# Patient Record
Sex: FEMALE | Race: WHITE | NOT HISPANIC OR LATINO
[De-identification: names, ages, dates, MRNs, and addresses within clinical notes are randomized per-mention and may not be internally consistent; named-entity substitution may affect disease eponyms.]

---
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
## 2019-02-07 ENCOUNTER — TRANSCRIPTION ENCOUNTER (OUTPATIENT)
Age: 19
End: 2019-02-07

## 2019-04-02 ENCOUNTER — APPOINTMENT (OUTPATIENT)
Dept: FAMILY MEDICINE | Facility: CLINIC | Age: 19
End: 2019-04-02
Payer: COMMERCIAL

## 2019-04-02 VITALS
HEART RATE: 74 BPM | RESPIRATION RATE: 16 BRPM | BODY MASS INDEX: 20.61 KG/M2 | HEIGHT: 62 IN | OXYGEN SATURATION: 98 % | TEMPERATURE: 97.8 F | WEIGHT: 112 LBS | SYSTOLIC BLOOD PRESSURE: 100 MMHG | DIASTOLIC BLOOD PRESSURE: 64 MMHG

## 2019-04-02 DIAGNOSIS — Z81.8 FAMILY HISTORY OF OTHER MENTAL AND BEHAVIORAL DISORDERS: ICD-10-CM

## 2019-04-02 DIAGNOSIS — R79.9 ABNORMAL FINDING OF BLOOD CHEMISTRY, UNSPECIFIED: ICD-10-CM

## 2019-04-02 LAB — CYTOLOGY CVX/VAG DOC THIN PREP: NORMAL

## 2019-04-02 PROCEDURE — 99385 PREV VISIT NEW AGE 18-39: CPT | Mod: 25

## 2019-04-02 PROCEDURE — G0444 DEPRESSION SCREEN ANNUAL: CPT

## 2019-04-02 PROCEDURE — 36415 COLL VENOUS BLD VENIPUNCTURE: CPT

## 2019-04-02 NOTE — PHYSICAL EXAM
[No Acute Distress] : no acute distress [Well Nourished] : well nourished [Well Developed] : well developed [Well-Appearing] : well-appearing [Normal Sclera/Conjunctiva] : normal sclera/conjunctiva [PERRL] : pupils equal round and reactive to light [EOMI] : extraocular movements intact [Normal Outer Ear/Nose] : the outer ears and nose were normal in appearance [Normal Oropharynx] : the oropharynx was normal [Normal TMs] : both tympanic membranes were normal [Supple] : supple [No Lymphadenopathy] : no lymphadenopathy [Thyroid Normal, No Nodules] : the thyroid was normal and there were no nodules present [No Respiratory Distress] : no respiratory distress  [Clear to Auscultation] : lungs were clear to auscultation bilaterally [No Accessory Muscle Use] : no accessory muscle use [Normal Rate] : normal rate  [Regular Rhythm] : with a regular rhythm [Normal S1, S2] : normal S1 and S2 [No Murmur] : no murmur heard [Pedal Pulses Present] : the pedal pulses are present [No Edema] : there was no peripheral edema [Soft] : abdomen soft [Non Tender] : non-tender [Non-distended] : non-distended [No Masses] : no abdominal mass palpated [No HSM] : no HSM [Normal Bowel Sounds] : normal bowel sounds [Normal Posterior Cervical Nodes] : no posterior cervical lymphadenopathy [Normal Anterior Cervical Nodes] : no anterior cervical lymphadenopathy [No CVA Tenderness] : no CVA  tenderness [No Spinal Tenderness] : no spinal tenderness [No Joint Swelling] : no joint swelling [Grossly Normal Strength/Tone] : grossly normal strength/tone [No Rash] : no rash [Normal Gait] : normal gait [No Focal Deficits] : no focal deficits [Normal Affect] : the affect was normal [Alert and Oriented x3] : oriented to person, place, and time [Normal Insight/Judgement] : insight and judgment were intact

## 2019-04-02 NOTE — ASSESSMENT
[FreeTextEntry1] : Health maintenance\par - comprehensive labs \par - last saw GYN in 7/2017 for pap\par - negative depression screening\par - will get vaccine records from previous PCP\par - had flu shot this year\par \par Stress incontinence\par - recommend pelvic floor exercise\par - urology consult\par \par Difficulty concentrating\par - noticed mostly with school work\par - work on focusing techniques\par - has never been on medication for this before\par - recommend neuropsych evaluation

## 2019-04-02 NOTE — HISTORY OF PRESENT ILLNESS
[FreeTextEntry1] : cpe [de-identified] : Last physical 2 years ago. \par Overall things going well, no major complaints. \par Dealing with significant stress incontinence.  She can't hold her bladder when she laughs or bears down.  It happens every time she laughs continuously.  It happens at least a few times per week.  She is currently wearing pads. \par She also had noticed some difficulty concentrating.  Her brother is diagnosed with ADHD.  Would like to be evaluated by neuro.

## 2019-04-02 NOTE — COUNSELING
[Healthy eating counseling provided] : healthy eating [Activity counseling provided] : activity [Good understanding] : Patient has a good understanding of disease, goals and obesity follow-up plan [ - Annual Depression Screening] : Annual Depression Screening [de-identified] : diet - healthy\par exercise - minimal

## 2019-04-02 NOTE — HEALTH RISK ASSESSMENT
[Good] : ~his/her~  mood as  good [No falls in past year] : Patient reported no falls in the past year [0] : 2) Feeling down, depressed, or hopeless: Not at all (0) [Patient reported PAP Smear was normal] : Patient reported PAP Smear was normal [HIV Test offered] : HIV Test offered [None] : None [With Family] : lives with family [Employed] : employed [Student] : student [Single] : single [Sexually Active] : sexually active [Feels Safe at Home] : Feels safe at home [Fully functional (bathing, dressing, toileting, transferring, walking, feeding)] : Fully functional (bathing, dressing, toileting, transferring, walking, feeding) [Fully functional (using the telephone, shopping, preparing meals, housekeeping, doing laundry, using] : Fully functional and needs no help or supervision to perform IADLs (using the telephone, shopping, preparing meals, housekeeping, doing laundry, using transportation, managing medications and managing finances) [Smoke Detector] : smoke detector [Carbon Monoxide Detector] : carbon monoxide detector [Seat Belt] :  uses seat belt [Sunscreen] : uses sunscreen [Patient/Caregiver not ready to engage] : Patient/Caregiver not ready to engage [] : No [[id number]] : 0 [Change in mental status noted] : No change in mental status noted [Language] : denies difficulty with language [Behavior] : denies difficulty with behavior [Reasoning] : denies difficulty with reasoning [High Risk Behavior] : no high risk behavior [Reports changes in hearing] : Reports no changes in hearing [Reports changes in vision] : Reports no changes in vision [Reports changes in dental health] : Reports no changes in dental health [PapSmearDate] : 7/2017 [FreeTextEntry2] : Lian, nursing, and is a nursing assistant at [employer]

## 2019-04-03 LAB
25(OH)D3 SERPL-MCNC: 25.2 NG/ML
ALBUMIN SERPL ELPH-MCNC: 4.5 G/DL
ALP BLD-CCNC: 71 U/L
ALT SERPL-CCNC: 14 U/L
ANION GAP SERPL CALC-SCNC: 11 MMOL/L
APPEARANCE: CLEAR
AST SERPL-CCNC: 16 U/L
BASOPHILS # BLD AUTO: 0.03 K/UL
BASOPHILS NFR BLD AUTO: 0.5 %
BILIRUB SERPL-MCNC: 0.2 MG/DL
BILIRUBIN URINE: NEGATIVE
BLOOD URINE: NEGATIVE
BUN SERPL-MCNC: 9 MG/DL
CALCIUM SERPL-MCNC: 9.4 MG/DL
CHLORIDE SERPL-SCNC: 105 MMOL/L
CHOLEST SERPL-MCNC: 188 MG/DL
CHOLEST/HDLC SERPL: 3.1 RATIO
CO2 SERPL-SCNC: 26 MMOL/L
COLOR: YELLOW
CREAT SERPL-MCNC: 0.58 MG/DL
EOSINOPHIL # BLD AUTO: 0.05 K/UL
EOSINOPHIL NFR BLD AUTO: 0.8 %
ESTIMATED AVERAGE GLUCOSE: 97 MG/DL
GLUCOSE QUALITATIVE U: NEGATIVE
GLUCOSE SERPL-MCNC: 90 MG/DL
HBA1C MFR BLD HPLC: 5 %
HCT VFR BLD CALC: 40.3 %
HDLC SERPL-MCNC: 60 MG/DL
HGB BLD-MCNC: 13.3 G/DL
HIV1+2 AB SPEC QL IA.RAPID: NONREACTIVE
IMM GRANULOCYTES NFR BLD AUTO: 0.2 %
KETONES URINE: NEGATIVE
LDLC SERPL CALC-MCNC: 108 MG/DL
LEUKOCYTE ESTERASE URINE: NEGATIVE
LYMPHOCYTES # BLD AUTO: 1.77 K/UL
LYMPHOCYTES NFR BLD AUTO: 29.1 %
MAN DIFF?: NORMAL
MCHC RBC-ENTMCNC: 30.6 PG
MCHC RBC-ENTMCNC: 33 GM/DL
MCV RBC AUTO: 92.6 FL
MONOCYTES # BLD AUTO: 0.42 K/UL
MONOCYTES NFR BLD AUTO: 6.9 %
NEUTROPHILS # BLD AUTO: 3.81 K/UL
NEUTROPHILS NFR BLD AUTO: 62.5 %
NITRITE URINE: NEGATIVE
PH URINE: 7.5
PLATELET # BLD AUTO: 267 K/UL
POTASSIUM SERPL-SCNC: 4.5 MMOL/L
PROT SERPL-MCNC: 7.1 G/DL
PROTEIN URINE: NEGATIVE
RBC # BLD: 4.35 M/UL
RBC # FLD: 11.9 %
SODIUM SERPL-SCNC: 142 MMOL/L
SPECIFIC GRAVITY URINE: 1.02
T4 FREE SERPL-MCNC: 1 NG/DL
TRIGL SERPL-MCNC: 102 MG/DL
TSH SERPL-ACNC: 3.14 UIU/ML
UROBILINOGEN URINE: NORMAL
WBC # FLD AUTO: 6.09 K/UL

## 2019-04-04 LAB — BACTERIA UR CULT: NORMAL

## 2019-04-09 ENCOUNTER — APPOINTMENT (OUTPATIENT)
Dept: UROLOGY | Facility: CLINIC | Age: 19
End: 2019-04-09
Payer: COMMERCIAL

## 2019-04-09 VITALS
HEIGHT: 62 IN | HEART RATE: 82 BPM | BODY MASS INDEX: 20.61 KG/M2 | TEMPERATURE: 98.2 F | WEIGHT: 112 LBS | OXYGEN SATURATION: 97 % | DIASTOLIC BLOOD PRESSURE: 62 MMHG | SYSTOLIC BLOOD PRESSURE: 99 MMHG

## 2019-04-09 PROCEDURE — 99204 OFFICE O/P NEW MOD 45 MIN: CPT | Mod: 25

## 2019-04-09 PROCEDURE — 51741 ELECTRO-UROFLOWMETRY FIRST: CPT

## 2019-04-09 PROCEDURE — 51798 US URINE CAPACITY MEASURE: CPT | Mod: 59

## 2019-04-09 NOTE — REVIEW OF SYSTEMS
[Eyesight Problems] : eyesight problems [see HPI] : see HPI [Date of last menstrual period ____] : date of last menstrual period: [unfilled] [Bladder fullness after urinating] : bladder fullness after urinating [Wait a long time to urinate] : waits a long time to urinate [Leakage of urine with straining, coughing, laughing] : leakage of urine with straining, coughing, laughing [Negative] : Endocrine

## 2019-04-09 NOTE — PHYSICAL EXAM

## 2019-04-09 NOTE — HISTORY OF PRESENT ILLNESS
[FreeTextEntry1] : 18 year old woman seen 04/09/2019 with complaint of stress urinary incontinence. This began years ago. \par It is severe in severity. She leaks with any valsalva such and cough or sneeze but most often laughter. She feels the leakage is getting worse. She states she doesn't void much, maybe once a day. She denies any UTIs now or as a younger child. No congenital or significant pediatric dieseaes. \par Nothing makes the symptoms better, nothing makes sx worse. It is associated with nothing.\par No hematuria, no dysuria, no frequency, no urgency, no hesitancy, no straining.\par No fevers, no chills, no nausea, no vomiting, no flank pain. No family history contributory to AKIN. \par \par Uroflow (04/09/2019 ): 3 small bell shaped curves, VV 57 mL, qMax 7 ml/s, qAve 4 ml/s\par PVR (04/09/2019 ): 6 mL

## 2019-04-11 ENCOUNTER — APPOINTMENT (OUTPATIENT)
Dept: UROLOGY | Facility: CLINIC | Age: 19
End: 2019-04-11

## 2019-04-12 ENCOUNTER — APPOINTMENT (OUTPATIENT)
Dept: UROLOGY | Facility: CLINIC | Age: 19
End: 2019-04-12

## 2019-04-18 ENCOUNTER — APPOINTMENT (OUTPATIENT)
Dept: UROLOGY | Facility: CLINIC | Age: 19
End: 2019-04-18

## 2019-07-09 ENCOUNTER — APPOINTMENT (OUTPATIENT)
Dept: FAMILY MEDICINE | Facility: CLINIC | Age: 19
End: 2019-07-09

## 2019-07-11 LAB
M TB IFN-G BLD-IMP: NEGATIVE
MEV IGG FLD QL IA: 21.9 AU/ML
MEV IGG+IGM SER-IMP: NEGATIVE
MUV AB SER-ACNC: POSITIVE
MUV IGG SER QL IA: 79.5 AU/ML
QUANTIFERON TB PLUS MITOGEN MINUS NIL: 9.4 IU/ML
QUANTIFERON TB PLUS NIL: 0.03 IU/ML
QUANTIFERON TB PLUS TB1 MINUS NIL: -0.01 IU/ML
QUANTIFERON TB PLUS TB2 MINUS NIL: -0.01 IU/ML
RUBV IGG FLD-ACNC: 5 INDEX
RUBV IGG SER-IMP: POSITIVE
VZV AB TITR SER: POSITIVE
VZV IGG SER IF-ACNC: 188.6 INDEX

## 2019-07-12 ENCOUNTER — APPOINTMENT (OUTPATIENT)
Dept: FAMILY MEDICINE | Facility: CLINIC | Age: 19
End: 2019-07-12

## 2019-09-19 ENCOUNTER — TRANSCRIPTION ENCOUNTER (OUTPATIENT)
Age: 19
End: 2019-09-19

## 2019-11-14 ENCOUNTER — INBOUND DOCUMENT (OUTPATIENT)
Age: 19
End: 2019-11-14

## 2019-11-14 ENCOUNTER — TRANSCRIPTION ENCOUNTER (OUTPATIENT)
Age: 19
End: 2019-11-14

## 2019-11-15 ENCOUNTER — INBOUND DOCUMENT (OUTPATIENT)
Age: 19
End: 2019-11-15

## 2019-11-15 ENCOUNTER — EMERGENCY (EMERGENCY)
Facility: HOSPITAL | Age: 19
LOS: 0 days | Discharge: HOME | End: 2019-11-15
Attending: EMERGENCY MEDICINE
Payer: COMMERCIAL

## 2019-11-15 VITALS
DIASTOLIC BLOOD PRESSURE: 76 MMHG | WEIGHT: 110.01 LBS | TEMPERATURE: 99 F | RESPIRATION RATE: 18 BRPM | OXYGEN SATURATION: 94 % | HEART RATE: 114 BPM | HEIGHT: 62 IN | SYSTOLIC BLOOD PRESSURE: 114 MMHG

## 2019-11-15 VITALS
HEART RATE: 86 BPM | TEMPERATURE: 98 F | OXYGEN SATURATION: 100 % | RESPIRATION RATE: 17 BRPM | DIASTOLIC BLOOD PRESSURE: 55 MMHG | SYSTOLIC BLOOD PRESSURE: 91 MMHG

## 2019-11-15 DIAGNOSIS — J02.9 ACUTE PHARYNGITIS, UNSPECIFIED: ICD-10-CM

## 2019-11-15 DIAGNOSIS — R50.9 FEVER, UNSPECIFIED: ICD-10-CM

## 2019-11-15 DIAGNOSIS — B27.90 INFECTIOUS MONONUCLEOSIS, UNSPECIFIED WITHOUT COMPLICATION: ICD-10-CM

## 2019-11-15 PROCEDURE — 99284 EMERGENCY DEPT VISIT MOD MDM: CPT

## 2019-11-15 PROCEDURE — 96361 HYDRATE IV INFUSION ADD-ON: CPT

## 2019-11-15 PROCEDURE — 81025 URINE PREGNANCY TEST: CPT

## 2019-11-15 PROCEDURE — 96365 THER/PROPH/DIAG IV INF INIT: CPT

## 2019-11-15 PROCEDURE — 99284 EMERGENCY DEPT VISIT MOD MDM: CPT | Mod: 25

## 2019-11-15 PROCEDURE — 96368 THER/DIAG CONCURRENT INF: CPT

## 2019-11-15 PROCEDURE — 96375 TX/PRO/DX INJ NEW DRUG ADDON: CPT

## 2019-11-15 RX ORDER — DEXAMETHASONE 0.5 MG/5ML
10 ELIXIR ORAL ONCE
Refills: 0 | Status: COMPLETED | OUTPATIENT
Start: 2019-11-15 | End: 2019-11-15

## 2019-11-15 RX ORDER — KETOROLAC TROMETHAMINE 30 MG/ML
30 SYRINGE (ML) INJECTION ONCE
Refills: 0 | Status: DISCONTINUED | OUTPATIENT
Start: 2019-11-15 | End: 2019-11-15

## 2019-11-15 RX ORDER — SODIUM CHLORIDE 9 MG/ML
2000 INJECTION INTRAMUSCULAR; INTRAVENOUS; SUBCUTANEOUS ONCE
Refills: 0 | Status: COMPLETED | OUTPATIENT
Start: 2019-11-15 | End: 2019-11-15

## 2019-11-15 RX ORDER — ACETAMINOPHEN 500 MG
1000 TABLET ORAL ONCE
Refills: 0 | Status: COMPLETED | OUTPATIENT
Start: 2019-11-15 | End: 2019-11-15

## 2019-11-15 RX ADMIN — Medication 102 MILLIGRAM(S): at 07:11

## 2019-11-15 RX ADMIN — Medication 1000 MILLIGRAM(S): at 07:43

## 2019-11-15 RX ADMIN — SODIUM CHLORIDE 2000 MILLILITER(S): 9 INJECTION INTRAMUSCULAR; INTRAVENOUS; SUBCUTANEOUS at 09:51

## 2019-11-15 RX ADMIN — Medication 30 MILLIGRAM(S): at 09:51

## 2019-11-15 RX ADMIN — Medication 10 MILLIGRAM(S): at 08:08

## 2019-11-15 RX ADMIN — Medication 400 MILLIGRAM(S): at 07:05

## 2019-11-15 RX ADMIN — SODIUM CHLORIDE 2000 MILLILITER(S): 9 INJECTION INTRAMUSCULAR; INTRAVENOUS; SUBCUTANEOUS at 07:06

## 2019-11-15 NOTE — ED ADULT NURSE REASSESSMENT NOTE - NS ED NURSE REASSESS COMMENT FT1
received pt from overnight RN Cristy. Pt resting comfortably in stretcher. Complaining of general malaise at this time. Oriented to room and plan of care. Safety maintained. Will ctm

## 2019-11-15 NOTE — ED PROVIDER NOTE - CONSTITUTIONAL, MLM
normal... Well appearing, well nourished, awake, alert, oriented to person, place, time/situation; appears uncomfortable but nontoxic

## 2019-11-15 NOTE — ED PROVIDER NOTE - CARE PLAN
Principal Discharge DX:	Febrile illness Principal Discharge DX:	Febrile illness  Secondary Diagnosis:	Mononucleosis syndrome

## 2019-11-15 NOTE — ED PROVIDER NOTE - CLINICAL SUMMARY MEDICAL DECISION MAKING FREE TEXT BOX
febrile illness, pos Monospot yesterday, nontoxic appearing.  will give tylenol and fluids; decadron.

## 2019-11-15 NOTE — ED ADULT NURSE NOTE - OBJECTIVE STATEMENT
Patient presents to ER with c/o 4 days of sore throat, body aches and feeling like she is having a hard time breathing. Patient with +mono.

## 2019-11-15 NOTE — ED PROVIDER NOTE - PROGRESS NOTE DETAILS
still with myalgias and sore throat but improved will give dose of toradfol and d/c home with mother BETI Perrin DO

## 2019-11-15 NOTE — ED PROVIDER NOTE - NSFOLLOWUPINSTRUCTIONS_ED_ALL_ED_FT
Follow up with your PMD this week  Ibuprofen 600mg every 6 hours for pain and fever  Tylenol 1gram every 6 hours for pain and fever  Stay well hydrated  Return to ED for any further concerns or worsening symptoms    Pharyngitis    Pharyngitis is inflammation of your pharynx, which is typically caused by a viral or bacterial infection. Pharyngitis can be contagious and may spread from person to person through intimate contact, coughing, sneezing, or sharing personal items and utensils. Symptoms of pharyngitis may include sore throat, fever, headache, or swollen lymph nodes. If you are prescribed antibiotics, make sure you finish them even if you start to feel better. Gargle with salt water as often as every 1-2 hours to soothe your throat. Throat lozenges (if you are not at risk for choking) or sprays may be used to soothe your throat.    SEEK IMMEDIATE MEDICAL CARE IF YOU HAVE ANY OF THE FOLLOWING SYMPTOMS: neck stiffness, drooling, hoarseness or change in voice, inability to swallow liquids, vomiting, or trouble breathing.

## 2019-11-15 NOTE — ED PROVIDER NOTE - ENMT, MLM
Airway patent, Nasal mucosa clear. Mouth with normal mucosa. Erythema to posterior pharynx with moderate swelling and no exudates.  Uvula wnl.  Patent airway.  No drooling.  No stridor or hoarse voice.

## 2019-11-15 NOTE — ED PROVIDER NOTE - OBJECTIVE STATEMENT
18 yo female with no pmh, on OCPs c/o 5 days of fever, chills, body aches, sore throat.  Seen at  yesterday and rapid strep negative and Monospot positive. Patient last took Tylenol at around 11pm and then woke this morning with fever 101 and chills.  Took 600mg Ibuprofen.  Pt feels throat tight with difficulty swallowing and breathing because of the throat.  No n/v/d.  No abdominal pain.  Mild cough, nonproductive.

## 2019-11-15 NOTE — ED PROVIDER NOTE - PATIENT PORTAL LINK FT
You can access the FollowMyHealth Patient Portal offered by [facility] by registering at the following website: [email or web address]. By joining [facility]’s FollowMyHealth portal, you will also be able to view your health information using other applications (apps) compatible with our system.

## 2019-11-19 ENCOUNTER — APPOINTMENT (OUTPATIENT)
Dept: FAMILY MEDICINE | Facility: CLINIC | Age: 19
End: 2019-11-19
Payer: COMMERCIAL

## 2019-11-19 VITALS
DIASTOLIC BLOOD PRESSURE: 60 MMHG | BODY MASS INDEX: 20.61 KG/M2 | HEART RATE: 72 BPM | SYSTOLIC BLOOD PRESSURE: 100 MMHG | HEIGHT: 62 IN | OXYGEN SATURATION: 98 % | RESPIRATION RATE: 16 BRPM | WEIGHT: 112 LBS

## 2019-11-19 PROCEDURE — 99214 OFFICE O/P EST MOD 30 MIN: CPT

## 2019-11-19 NOTE — HISTORY OF PRESENT ILLNESS
[FreeTextEntry1] : follow up from urgent care [de-identified] : Patient was seen at urgent care on 11/14 and was diagnosed with mono. \par She had been having sore throat and fever.  The fever went up to 105. \par She was advised to go to the ER for hydration and treatment. \par She went to [facility] and she was given steroids and IV fluid.\par She is feeling better now and only has minor fatigue. \par She needs a note for clearance to go back to school and work.

## 2019-11-19 NOTE — ASSESSMENT
[FreeTextEntry1] : Mono\par Fatigue\par Lymphadenopathy\par - diagnosed at urgent care\par - treated in ER with fluids and steroids\par - lymphadenopathy improving but still present - continue steroid for 5 more days\par - otherwise asymptomatic\par - may return to work and school\par - avoid contact sports for another two weeks

## 2019-11-19 NOTE — PHYSICAL EXAM
[No Acute Distress] : no acute distress [Well Nourished] : well nourished [Well Developed] : well developed [Normal Sclera/Conjunctiva] : normal sclera/conjunctiva [PERRL] : pupils equal round and reactive to light [EOMI] : extraocular movements intact [Normal Outer Ear/Nose] : the outer ears and nose were normal in appearance [Normal Oropharynx] : the oropharynx was normal [Normal TMs] : both tympanic membranes were normal [Supple] : supple [No Accessory Muscle Use] : no accessory muscle use [No Respiratory Distress] : no respiratory distress  [Clear to Auscultation] : lungs were clear to auscultation bilaterally [Regular Rhythm] : with a regular rhythm [Normal Rate] : normal rate  [Normal S1, S2] : normal S1 and S2 [Soft] : abdomen soft [Non Tender] : non-tender [Non-distended] : non-distended [Normal Bowel Sounds] : normal bowel sounds [Normal Anterior Cervical Nodes] : no anterior cervical lymphadenopathy [No Spinal Tenderness] : no spinal tenderness [Grossly Normal Strength/Tone] : grossly normal strength/tone [No Focal Deficits] : no focal deficits [No Rash] : no rash [Normal Gait] : normal gait [Normal Affect] : the affect was normal [Normal Insight/Judgement] : insight and judgment were intact [de-identified] : anterior and posterior lymphadenopathy

## 2019-12-13 ENCOUNTER — APPOINTMENT (OUTPATIENT)
Dept: FAMILY MEDICINE | Facility: CLINIC | Age: 19
End: 2019-12-13

## 2019-12-15 ENCOUNTER — MOBILE ON CALL (OUTPATIENT)
Age: 19
End: 2019-12-15

## 2019-12-16 LAB
MEV IGG FLD QL IA: 110 AU/ML
MEV IGG+IGM SER-IMP: POSITIVE

## 2020-01-28 ENCOUNTER — APPOINTMENT (OUTPATIENT)
Dept: DERMATOLOGY | Facility: CLINIC | Age: 20
End: 2020-01-28
Payer: COMMERCIAL

## 2020-01-28 PROCEDURE — 99203 OFFICE O/P NEW LOW 30 MIN: CPT

## 2020-04-25 ENCOUNTER — MESSAGE (OUTPATIENT)
Age: 20
End: 2020-04-25

## 2020-04-28 ENCOUNTER — APPOINTMENT (OUTPATIENT)
Dept: DERMATOLOGY | Facility: CLINIC | Age: 20
End: 2020-04-28

## 2020-05-01 LAB
SARS-COV-2 IGG SERPL IA-ACNC: <0.1 INDEX
SARS-COV-2 IGG SERPL QL IA: NEGATIVE

## 2020-12-19 ENCOUNTER — OUTPATIENT (OUTPATIENT)
Dept: OUTPATIENT SERVICES | Facility: HOSPITAL | Age: 20
LOS: 1 days | End: 2020-12-19
Payer: COMMERCIAL

## 2020-12-19 DIAGNOSIS — Z20.828 CONTACT WITH AND (SUSPECTED) EXPOSURE TO OTHER VIRAL COMMUNICABLE DISEASES: ICD-10-CM

## 2020-12-19 LAB — SARS-COV-2 RNA SPEC QL NAA+PROBE: SIGNIFICANT CHANGE UP

## 2020-12-19 PROCEDURE — U0003: CPT

## 2020-12-20 DIAGNOSIS — Z20.828 CONTACT WITH AND (SUSPECTED) EXPOSURE TO OTHER VIRAL COMMUNICABLE DISEASES: ICD-10-CM

## 2021-01-02 ENCOUNTER — OUTPATIENT (OUTPATIENT)
Dept: OUTPATIENT SERVICES | Facility: HOSPITAL | Age: 21
LOS: 1 days | End: 2021-01-02
Payer: COMMERCIAL

## 2021-01-02 ENCOUNTER — TRANSCRIPTION ENCOUNTER (OUTPATIENT)
Age: 21
End: 2021-01-02

## 2021-01-02 DIAGNOSIS — Z20.828 CONTACT WITH AND (SUSPECTED) EXPOSURE TO OTHER VIRAL COMMUNICABLE DISEASES: ICD-10-CM

## 2021-01-02 LAB — SARS-COV-2 RNA SPEC QL NAA+PROBE: DETECTED

## 2021-01-02 PROCEDURE — U0003: CPT

## 2021-01-02 PROCEDURE — C9803: CPT

## 2021-01-02 PROCEDURE — U0005: CPT

## 2021-01-03 DIAGNOSIS — Z20.828 CONTACT WITH AND (SUSPECTED) EXPOSURE TO OTHER VIRAL COMMUNICABLE DISEASES: ICD-10-CM

## 2021-04-12 ENCOUNTER — APPOINTMENT (OUTPATIENT)
Dept: FAMILY MEDICINE | Facility: CLINIC | Age: 21
End: 2021-04-12
Payer: COMMERCIAL

## 2021-04-12 VITALS
SYSTOLIC BLOOD PRESSURE: 95 MMHG | TEMPERATURE: 98.9 F | OXYGEN SATURATION: 98 % | HEIGHT: 62 IN | DIASTOLIC BLOOD PRESSURE: 62 MMHG | HEART RATE: 59 BPM | BODY MASS INDEX: 2.58 KG/M2 | WEIGHT: 14 LBS

## 2021-04-12 DIAGNOSIS — Z80.42 FAMILY HISTORY OF MALIGNANT NEOPLASM OF PROSTATE: ICD-10-CM

## 2021-04-12 DIAGNOSIS — Z92.29 PERSONAL HISTORY OF OTHER DRUG THERAPY: ICD-10-CM

## 2021-04-12 DIAGNOSIS — Z87.2 PERSONAL HISTORY OF DISEASES OF THE SKIN AND SUBCUTANEOUS TISSUE: ICD-10-CM

## 2021-04-12 DIAGNOSIS — Z86.19 PERSONAL HISTORY OF OTHER INFECTIOUS AND PARASITIC DISEASES: ICD-10-CM

## 2021-04-12 PROCEDURE — 99072 ADDL SUPL MATRL&STAF TM PHE: CPT

## 2021-04-12 PROCEDURE — 99395 PREV VISIT EST AGE 18-39: CPT

## 2021-04-12 RX ORDER — PREDNISONE 20 MG/1
20 TABLET ORAL DAILY
Qty: 5 | Refills: 0 | Status: COMPLETED | COMMUNITY
Start: 2019-11-19 | End: 2021-04-12

## 2021-04-12 RX ORDER — SPIRONOLACTONE 50 MG/1
50 TABLET ORAL TWICE DAILY
Qty: 60 | Refills: 2 | Status: COMPLETED | COMMUNITY
Start: 2020-01-28 | End: 2021-04-12

## 2021-04-12 RX ORDER — CHLORHEXIDINE GLUCONATE 213 G/1000ML
4 SOLUTION TOPICAL
Qty: 1 | Refills: 3 | Status: COMPLETED | COMMUNITY
Start: 2020-01-28 | End: 2021-04-12

## 2021-04-12 NOTE — PHYSICAL EXAM
[No Acute Distress] : no acute distress [Well Nourished] : well nourished [Well Developed] : well developed [Well-Appearing] : well-appearing [Normal Sclera/Conjunctiva] : normal sclera/conjunctiva [PERRL] : pupils equal round and reactive to light [EOMI] : extraocular movements intact [Normal Outer Ear/Nose] : the outer ears and nose were normal in appearance [Normal Oropharynx] : the oropharynx was normal [No JVD] : no jugular venous distention [No Lymphadenopathy] : no lymphadenopathy [Supple] : supple [Thyroid Normal, No Nodules] : the thyroid was normal and there were no nodules present [No Respiratory Distress] : no respiratory distress  [No Accessory Muscle Use] : no accessory muscle use [Clear to Auscultation] : lungs were clear to auscultation bilaterally [Normal Rate] : normal rate  [Regular Rhythm] : with a regular rhythm [Normal S1, S2] : normal S1 and S2 [No Murmur] : no murmur heard [Pedal Pulses Present] : the pedal pulses are present [No Edema] : there was no peripheral edema [Soft] : abdomen soft [Non Tender] : non-tender [Non-distended] : non-distended [No Masses] : no abdominal mass palpated [No HSM] : no HSM [Normal Bowel Sounds] : normal bowel sounds [Normal Posterior Cervical Nodes] : no posterior cervical lymphadenopathy [Normal Anterior Cervical Nodes] : no anterior cervical lymphadenopathy [No CVA Tenderness] : no CVA  tenderness [No Spinal Tenderness] : no spinal tenderness [No Joint Swelling] : no joint swelling [Grossly Normal Strength/Tone] : grossly normal strength/tone [No Rash] : no rash [Coordination Grossly Intact] : coordination grossly intact [No Focal Deficits] : no focal deficits [Normal Gait] : normal gait [Deep Tendon Reflexes (DTR)] : deep tendon reflexes were 2+ and symmetric [Normal Affect] : the affect was normal [Normal Insight/Judgement] : insight and judgment were intact [Alert and Oriented x3] : oriented to person, place, and time

## 2021-04-12 NOTE — HEALTH RISK ASSESSMENT
[Very Good] : ~his/her~  mood as very good [Intercurrent hospitalizations] : was admitted to the hospital  [Yes] : Yes [2 - 4 times a month (2 pts)] : 2-4 times a month (2 points) [3 or 4 (1 pt)] : 3 or 4  (1 point) [Never (0 pts)] : Never (0 points) [No] : In the past 12 months have you used drugs other than those required for medical reasons? No [No falls in past year] : Patient reported no falls in the past year [0] : 2) Feeling down, depressed, or hopeless: Not at all (0) [None] : None [With Family] : lives with family [# of Members in Household ___] :  household currently consist of [unfilled] member(s) [Employed] : employed [Single] : single [Sexually Active] : sexually active [Feels Safe at Home] : Feels safe at home [Smoke Detector] : smoke detector [Carbon Monoxide Detector] : carbon monoxide detector [Seat Belt] :  uses seat belt [] : No [de-identified] : Socially  [Audit-CScore] : 3 [[id number]] : 0 [HIV test declined] : HIV test declined [Change in mental status noted] : No change in mental status noted [Language] : denies difficulty with language [Behavior] : denies difficulty with behavior [Learning/Retaining New Information] : denies difficulty learning/retaining new information [Handling Complex Tasks] : denies difficulty handling complex tasks [Reasoning] : denies difficulty with reasoning [Spatial Ability and Orientation] : denies difficulty with spatial ability and orientation [Reports changes in hearing] : Reports no changes in hearing [Reports changes in vision] : Reports no changes in vision [Reports normal functional visual acuity (ie: able to read med bottle)] : Reports poor functional visual acuity.  [Reports changes in dental health] : Reports no changes in dental health [Guns at Home] : no guns at home [Sunscreen] : does not use sunscreen [de-identified] : 1 partner [Patient/Caregiver not ready to engage] : Patient/Caregiver not ready to engage

## 2021-04-12 NOTE — ASSESSMENT
[FreeTextEntry1] : Annual Physical\par -flu vaccine in fall 2020\par -recommend f/u with gyn Dr. Chaudhary for pap smear\par -pfizer vaccine(1/2) received\par -refused std screening\par -ABO screening for blood testing\par -pt requesting titers for nursing school, f/u result\par -routine blood work, f/u results

## 2021-04-12 NOTE — HISTORY OF PRESENT ILLNESS
[FreeTextEntry1] : Comprehensive exam [de-identified] : 21 y/o F w/ no significant PMHx here for annual physical. Had Pfizer vaccine(1/2) on April 9th. Sees dentist annually. Has not seen optometrist in about a year. States she was admitted to [facility] in Jan 2021 and hospitalized for 2 days for acute respiratory failure 2/2 Covid-19, required oxygen, and weaned herself off of it at home. Currently doing well. No chest pain or shortness of breath with activities.

## 2021-05-17 ENCOUNTER — OUTPATIENT (OUTPATIENT)
Dept: OUTPATIENT SERVICES | Facility: HOSPITAL | Age: 21
LOS: 1 days | End: 2021-05-17
Payer: COMMERCIAL

## 2021-05-17 DIAGNOSIS — Z20.828 CONTACT WITH AND (SUSPECTED) EXPOSURE TO OTHER VIRAL COMMUNICABLE DISEASES: ICD-10-CM

## 2021-05-17 LAB — SARS-COV-2 RNA SPEC QL NAA+PROBE: SIGNIFICANT CHANGE UP

## 2021-05-17 PROCEDURE — U0005: CPT

## 2021-05-17 PROCEDURE — U0003: CPT

## 2021-05-17 PROCEDURE — C9803: CPT

## 2021-05-18 DIAGNOSIS — Z20.828 CONTACT WITH AND (SUSPECTED) EXPOSURE TO OTHER VIRAL COMMUNICABLE DISEASES: ICD-10-CM

## 2021-07-07 ENCOUNTER — TRANSCRIPTION ENCOUNTER (OUTPATIENT)
Age: 21
End: 2021-07-07

## 2021-08-12 ENCOUNTER — NON-APPOINTMENT (OUTPATIENT)
Age: 21
End: 2021-08-12

## 2021-08-12 ENCOUNTER — APPOINTMENT (OUTPATIENT)
Dept: DERMATOLOGY | Facility: CLINIC | Age: 21
End: 2021-08-12
Payer: COMMERCIAL

## 2021-08-12 PROCEDURE — 99214 OFFICE O/P EST MOD 30 MIN: CPT

## 2021-08-12 NOTE — HISTORY OF PRESENT ILLNESS
[FreeTextEntry1] : hyperhidrosis [de-identified] : 21 year old female here for hyperhidrosis, acne, and nail issue.
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
## 2021-08-12 NOTE — PHYSICAL EXAM
[FreeTextEntry3] : AAOx3, pleasant, NAD, no visual lymphadenopathy\par hair, scalp, face, nose, eyelids, ears, lips, oropharynx, neck, chest, abdomen, back, right arm, left arm, nails, and hands examined with all normal findings,\par pertinent findings include:\par \par multiple papules, pustules and open comedones on face\par sweating in axillae\par right 2nd digit with trauma

## 2021-08-25 ENCOUNTER — APPOINTMENT (OUTPATIENT)
Dept: DERMATOLOGY | Facility: CLINIC | Age: 21
End: 2021-08-25
Payer: COMMERCIAL

## 2021-08-25 PROCEDURE — 64650 CHEMODENERV ECCRINE GLANDS: CPT

## 2021-08-25 PROCEDURE — 99213 OFFICE O/P EST LOW 20 MIN: CPT | Mod: 25

## 2021-08-25 NOTE — ASSESSMENT
[FreeTextEntry1] : Risks and benefits of botulinum toxin for hyperhidrosis were discussed.\par Intradermal injection of Botox was performed.\par Total volume injected:  6 cc/ 200 U (100 U per side)\par Aluminum chloride was used for hemostasis\par The patient tolerated the procedure well.\par [id number]\par 04/2024\par \par multiple benign nevi and lentigines\par

## 2021-09-24 ENCOUNTER — TRANSCRIPTION ENCOUNTER (OUTPATIENT)
Age: 21
End: 2021-09-24

## 2021-09-28 ENCOUNTER — RX RENEWAL (OUTPATIENT)
Age: 21
End: 2021-09-28

## 2021-10-12 ENCOUNTER — TRANSCRIPTION ENCOUNTER (OUTPATIENT)
Age: 21
End: 2021-10-12

## 2021-10-23 ENCOUNTER — TRANSCRIPTION ENCOUNTER (OUTPATIENT)
Age: 21
End: 2021-10-23

## 2021-11-15 ENCOUNTER — RX RENEWAL (OUTPATIENT)
Age: 21
End: 2021-11-15

## 2021-12-01 ENCOUNTER — EMERGENCY (EMERGENCY)
Facility: HOSPITAL | Age: 21
LOS: 0 days | Discharge: HOME | End: 2021-12-01
Attending: EMERGENCY MEDICINE
Payer: COMMERCIAL

## 2021-12-01 VITALS
OXYGEN SATURATION: 100 % | SYSTOLIC BLOOD PRESSURE: 101 MMHG | HEART RATE: 81 BPM | RESPIRATION RATE: 18 BRPM | TEMPERATURE: 99 F | DIASTOLIC BLOOD PRESSURE: 61 MMHG

## 2021-12-01 VITALS — HEIGHT: 62 IN | WEIGHT: 110.01 LBS

## 2021-12-01 DIAGNOSIS — R19.7 DIARRHEA, UNSPECIFIED: ICD-10-CM

## 2021-12-01 DIAGNOSIS — F41.9 ANXIETY DISORDER, UNSPECIFIED: ICD-10-CM

## 2021-12-01 DIAGNOSIS — R11.2 NAUSEA WITH VOMITING, UNSPECIFIED: ICD-10-CM

## 2021-12-01 DIAGNOSIS — R05.9 COUGH, UNSPECIFIED: ICD-10-CM

## 2021-12-01 DIAGNOSIS — Z20.822 CONTACT WITH AND (SUSPECTED) EXPOSURE TO COVID-19: ICD-10-CM

## 2021-12-01 DIAGNOSIS — K52.9 NONINFECTIVE GASTROENTERITIS AND COLITIS, UNSPECIFIED: ICD-10-CM

## 2021-12-01 LAB
ADD ON TEST-SPECIMEN IN LAB: SIGNIFICANT CHANGE UP
ALBUMIN SERPL ELPH-MCNC: 4.4 G/DL — SIGNIFICANT CHANGE UP (ref 3.3–5)
ALP SERPL-CCNC: 91 U/L — SIGNIFICANT CHANGE UP (ref 40–120)
ALT FLD-CCNC: 31 U/L — SIGNIFICANT CHANGE UP (ref 12–78)
ANION GAP SERPL CALC-SCNC: 7 MMOL/L — SIGNIFICANT CHANGE UP (ref 5–17)
APPEARANCE UR: ABNORMAL
AST SERPL-CCNC: 15 U/L — SIGNIFICANT CHANGE UP (ref 15–37)
BASOPHILS # BLD AUTO: 0.02 K/UL — SIGNIFICANT CHANGE UP (ref 0–0.2)
BASOPHILS NFR BLD AUTO: 0.1 % — SIGNIFICANT CHANGE UP (ref 0–2)
BILIRUB SERPL-MCNC: 0.7 MG/DL — SIGNIFICANT CHANGE UP (ref 0.2–1.2)
BILIRUB UR-MCNC: NEGATIVE — SIGNIFICANT CHANGE UP
BUN SERPL-MCNC: 17 MG/DL — SIGNIFICANT CHANGE UP (ref 7–23)
CALCIUM SERPL-MCNC: 8.8 MG/DL — SIGNIFICANT CHANGE UP (ref 8.5–10.1)
CHLORIDE SERPL-SCNC: 104 MMOL/L — SIGNIFICANT CHANGE UP (ref 96–108)
CO2 SERPL-SCNC: 25 MMOL/L — SIGNIFICANT CHANGE UP (ref 22–31)
COLOR SPEC: YELLOW — SIGNIFICANT CHANGE UP
CREAT SERPL-MCNC: 0.78 MG/DL — SIGNIFICANT CHANGE UP (ref 0.5–1.3)
CULTURE RESULTS: SIGNIFICANT CHANGE UP
DIFF PNL FLD: ABNORMAL
EOSINOPHIL # BLD AUTO: 0.01 K/UL — SIGNIFICANT CHANGE UP (ref 0–0.5)
EOSINOPHIL NFR BLD AUTO: 0.1 % — SIGNIFICANT CHANGE UP (ref 0–6)
GLUCOSE SERPL-MCNC: 119 MG/DL — HIGH (ref 70–99)
GLUCOSE UR QL: NEGATIVE MG/DL — SIGNIFICANT CHANGE UP
HCT VFR BLD CALC: 43.7 % — SIGNIFICANT CHANGE UP (ref 34.5–45)
HGB BLD-MCNC: 14.6 G/DL — SIGNIFICANT CHANGE UP (ref 11.5–15.5)
IMM GRANULOCYTES NFR BLD AUTO: 0.5 % — SIGNIFICANT CHANGE UP (ref 0–1.5)
KETONES UR-MCNC: NEGATIVE — SIGNIFICANT CHANGE UP
LEUKOCYTE ESTERASE UR-ACNC: NEGATIVE — SIGNIFICANT CHANGE UP
LYMPHOCYTES # BLD AUTO: 0.32 K/UL — LOW (ref 1–3.3)
LYMPHOCYTES # BLD AUTO: 2.3 % — LOW (ref 13–44)
MCHC RBC-ENTMCNC: 30.6 PG — SIGNIFICANT CHANGE UP (ref 27–34)
MCHC RBC-ENTMCNC: 33.4 GM/DL — SIGNIFICANT CHANGE UP (ref 32–36)
MCV RBC AUTO: 91.6 FL — SIGNIFICANT CHANGE UP (ref 80–100)
MONOCYTES # BLD AUTO: 0.36 K/UL — SIGNIFICANT CHANGE UP (ref 0–0.9)
MONOCYTES NFR BLD AUTO: 2.6 % — SIGNIFICANT CHANGE UP (ref 2–14)
NEUTROPHILS # BLD AUTO: 13.22 K/UL — HIGH (ref 1.8–7.4)
NEUTROPHILS NFR BLD AUTO: 94.4 % — HIGH (ref 43–77)
NITRITE UR-MCNC: NEGATIVE — SIGNIFICANT CHANGE UP
PH UR: 5 — SIGNIFICANT CHANGE UP (ref 5–8)
PLATELET # BLD AUTO: 273 K/UL — SIGNIFICANT CHANGE UP (ref 150–400)
POTASSIUM SERPL-MCNC: 3.7 MMOL/L — SIGNIFICANT CHANGE UP (ref 3.5–5.3)
POTASSIUM SERPL-SCNC: 3.7 MMOL/L — SIGNIFICANT CHANGE UP (ref 3.5–5.3)
PROT SERPL-MCNC: 8.6 GM/DL — HIGH (ref 6–8.3)
PROT UR-MCNC: 30 MG/DL
RBC # BLD: 4.77 M/UL — SIGNIFICANT CHANGE UP (ref 3.8–5.2)
RBC # FLD: 12.3 % — SIGNIFICANT CHANGE UP (ref 10.3–14.5)
SARS-COV-2 RNA SPEC QL NAA+PROBE: SIGNIFICANT CHANGE UP
SODIUM SERPL-SCNC: 136 MMOL/L — SIGNIFICANT CHANGE UP (ref 135–145)
SP GR SPEC: 1.02 — SIGNIFICANT CHANGE UP (ref 1.01–1.02)
SPECIMEN SOURCE: SIGNIFICANT CHANGE UP
UROBILINOGEN FLD QL: NEGATIVE MG/DL — SIGNIFICANT CHANGE UP
WBC # BLD: 14 K/UL — HIGH (ref 3.8–10.5)
WBC # FLD AUTO: 14 K/UL — HIGH (ref 3.8–10.5)

## 2021-12-01 PROCEDURE — 81001 URINALYSIS AUTO W/SCOPE: CPT

## 2021-12-01 PROCEDURE — 87086 URINE CULTURE/COLONY COUNT: CPT

## 2021-12-01 PROCEDURE — 74177 CT ABD & PELVIS W/CONTRAST: CPT | Mod: 26,MA

## 2021-12-01 PROCEDURE — U0005: CPT

## 2021-12-01 PROCEDURE — 71045 X-RAY EXAM CHEST 1 VIEW: CPT

## 2021-12-01 PROCEDURE — 96361 HYDRATE IV INFUSION ADD-ON: CPT

## 2021-12-01 PROCEDURE — 99285 EMERGENCY DEPT VISIT HI MDM: CPT

## 2021-12-01 PROCEDURE — 87493 C DIFF AMPLIFIED PROBE: CPT

## 2021-12-01 PROCEDURE — 87507 IADNA-DNA/RNA PROBE TQ 12-25: CPT

## 2021-12-01 PROCEDURE — 81025 URINE PREGNANCY TEST: CPT

## 2021-12-01 PROCEDURE — 85025 COMPLETE CBC W/AUTO DIFF WBC: CPT

## 2021-12-01 PROCEDURE — 36415 COLL VENOUS BLD VENIPUNCTURE: CPT

## 2021-12-01 PROCEDURE — 80053 COMPREHEN METABOLIC PANEL: CPT

## 2021-12-01 PROCEDURE — 93010 ELECTROCARDIOGRAM REPORT: CPT

## 2021-12-01 PROCEDURE — 99285 EMERGENCY DEPT VISIT HI MDM: CPT | Mod: 25

## 2021-12-01 PROCEDURE — 93005 ELECTROCARDIOGRAM TRACING: CPT

## 2021-12-01 PROCEDURE — 96375 TX/PRO/DX INJ NEW DRUG ADDON: CPT

## 2021-12-01 PROCEDURE — 74177 CT ABD & PELVIS W/CONTRAST: CPT | Mod: MA

## 2021-12-01 PROCEDURE — 71045 X-RAY EXAM CHEST 1 VIEW: CPT | Mod: 26

## 2021-12-01 PROCEDURE — 83690 ASSAY OF LIPASE: CPT

## 2021-12-01 PROCEDURE — 96374 THER/PROPH/DIAG INJ IV PUSH: CPT | Mod: XU

## 2021-12-01 PROCEDURE — U0003: CPT

## 2021-12-01 RX ORDER — METOCLOPRAMIDE HCL 10 MG
10 TABLET ORAL ONCE
Refills: 0 | Status: DISCONTINUED | OUTPATIENT
Start: 2021-12-01 | End: 2021-12-01

## 2021-12-01 RX ORDER — ONDANSETRON 8 MG/1
1 TABLET, FILM COATED ORAL
Qty: 21 | Refills: 0
Start: 2021-12-01 | End: 2021-12-07

## 2021-12-01 RX ORDER — LOPERAMIDE HCL 2 MG
2 TABLET ORAL ONCE
Refills: 0 | Status: COMPLETED | OUTPATIENT
Start: 2021-12-01 | End: 2021-12-01

## 2021-12-01 RX ORDER — ACETAMINOPHEN 500 MG
650 TABLET ORAL ONCE
Refills: 0 | Status: COMPLETED | OUTPATIENT
Start: 2021-12-01 | End: 2021-12-01

## 2021-12-01 RX ORDER — SODIUM CHLORIDE 9 MG/ML
1000 INJECTION INTRAMUSCULAR; INTRAVENOUS; SUBCUTANEOUS ONCE
Refills: 0 | Status: COMPLETED | OUTPATIENT
Start: 2021-12-01 | End: 2021-12-01

## 2021-12-01 RX ORDER — ONDANSETRON 8 MG/1
4 TABLET, FILM COATED ORAL ONCE
Refills: 0 | Status: COMPLETED | OUTPATIENT
Start: 2021-12-01 | End: 2021-12-01

## 2021-12-01 RX ORDER — IBUPROFEN 200 MG
600 TABLET ORAL ONCE
Refills: 0 | Status: COMPLETED | OUTPATIENT
Start: 2021-12-01 | End: 2021-12-01

## 2021-12-01 RX ADMIN — Medication 650 MILLIGRAM(S): at 12:54

## 2021-12-01 RX ADMIN — ONDANSETRON 4 MILLIGRAM(S): 8 TABLET, FILM COATED ORAL at 11:09

## 2021-12-01 RX ADMIN — Medication 650 MILLIGRAM(S): at 14:51

## 2021-12-01 RX ADMIN — ONDANSETRON 4 MILLIGRAM(S): 8 TABLET, FILM COATED ORAL at 14:49

## 2021-12-01 RX ADMIN — SODIUM CHLORIDE 1000 MILLILITER(S): 9 INJECTION INTRAMUSCULAR; INTRAVENOUS; SUBCUTANEOUS at 12:10

## 2021-12-01 RX ADMIN — Medication 30 MILLILITER(S): at 12:54

## 2021-12-01 RX ADMIN — Medication 2 MILLIGRAM(S): at 14:50

## 2021-12-01 RX ADMIN — SODIUM CHLORIDE 1000 MILLILITER(S): 9 INJECTION INTRAMUSCULAR; INTRAVENOUS; SUBCUTANEOUS at 11:09

## 2021-12-01 RX ADMIN — Medication 600 MILLIGRAM(S): at 14:05

## 2021-12-01 NOTE — ED STATDOCS - OBJECTIVE STATEMENT
20 y/o female with a PMHx of anxiety on Zoloft presents to the ED c/o n/v/d since 3am. Pt reports cold symptoms x2 weeks. Pt reports n/v/d starting today. Pt reports seeing spots of blood in her emesis and BMs. +cough. Currently on menstrual period. No hx of abd surgeries. Vaccinated for COVID. Allergies: Penicillin. No other complaints at this time.

## 2021-12-01 NOTE — ED ADULT NURSE NOTE - CHIEF COMPLAINT QUOTE
Pt presents to er with complaints of emesis/diarrhea starting at 3am,  denies fevers, abd pain at this time. Pt now reports she has spots of bright blood in her stool and when she has episodes of emesis.

## 2021-12-01 NOTE — ED ADULT NURSE NOTE - OBJECTIVE STATEMENT
Pt presents to ED for n/v/d since 3am this morning. Pt reports seeing spots of blood in the emesis. Denies fever, respiratory symptoms

## 2021-12-01 NOTE — ED STATDOCS - PATIENT PORTAL LINK FT
You can access the FollowMyHealth Patient Portal offered by [facility] by registering at the following website: [email or web address]. By joining [facility]’s FollowMyHealth portal, you will also be able to view your health information using other applications (apps) compatible with our system.

## 2021-12-01 NOTE — ED STATDOCS - NSFOLLOWUPINSTRUCTIONS_ED_ALL_ED_FT
Take motrin/tylenol for pain.  Drink and stay hydrated with plenty of fluids/electrolyte drinks  Take the zofran as directed.   Follow up with your primary care doctor for further evaluation.    Return to the ER for any new or other concerns.      Food Poisoning      Food poisoning is an illness that is caused by eating or drinking contaminated foods or drinks. In most cases, food poisoning is mild and lasts 1–2 days. However, some cases can be serious, especially for people who have weak body defense systems (immune systems), older people, children and infants, and pregnant women.      What are the causes?  This condition is caused by contaminated food. Foods can become contaminated with viruses, bacteria, parasites, or mold due to:  •Poor personal hygiene, such as poor hand-washing practices.      •Storing food improperly, such as not refrigerating raw meat.      •Using unclean surfaces for preparing, serving, and storing food.      •Cooking or eating with unclean utensils.    If contaminated food is eaten, viruses, bacteria, or parasites can harm the intestine. This often causes severe diarrhea. The most common causes of food poisoning include:•Viruses, such as:  •Norovirus.      •Rotavirus.      •Bacteria, such as:  •Salmonella.      •Listeria.      •E. coli (Escherichia coli).      •Parasites, such as:  •Giardia.      •Toxoplasma gondii.          What are the signs or symptoms?  Symptoms may take several hours to appear after you consume contaminated food or drink. Symptoms include:  •Nausea.      •Vomiting.      •Cramping.      •Diarrhea.      •Fever and chills.      •Muscle aches.      •Dehydration. Dehydration can cause you to be tired and thirsty, have a dry mouth, and urinate less frequently.        How is this diagnosed?  Your health care provider can diagnose food poisoning with your medical history and a physical exam. This will include asking you what you have recently eaten. You may also have tests, including:  •Blood tests.      •Stool tests.        How is this treated?    Treatment focuses on relieving your symptoms and making sure that you are hydrated. You may also be given medicines. In severe cases, hospitalization may be required and you may need to receive fluids through an IV.      Follow these instructions at home:      Eating and drinking      •Drink enough fluids to keep your urine pale yellow. You may need to drink small amounts of clear liquids frequently.      •Avoid milk, caffeine, and alcohol.      •Ask your health care provider for specific rehydration instructions.      •Eat small, frequent meals rather than large meals.      Medicines     •Take over-the-counter and prescription medicines only as told by your health care provider. Ask your health care provider if you should continue to take any of your regular prescribed and over-the-counter medicines.      •If you were prescribed an antibiotic medicine, take it as told by your health care provider. Do not stop taking the antibiotic even if you start to feel better.        General instructions      •Wash your hands thoroughly before you prepare food and after you go to the bathroom (use the toilet). Make sure that the people who live with you also wash their hands often.      •Rest at home until you feel better.      •Clean surfaces that you touch with a product that contains chlorine bleach.      •Keep all follow-up visits as told by your health care provider. This is important.        How is this prevented?    •Wash your hands, food preparation surfaces, and utensils thoroughly before and after you handle raw foods.      •Use separate food preparation surfaces and storage spaces for raw meat and for fruits and vegetables.      •Keep refrigerated foods colder than 40°F (5°C).      •Serve hot foods immediately or keep them heated above 140°F (60°C).      •Store dry foods in cool, dry spaces away from excess heat or moisture. Throw out any foods that do not smell right or are in cans that are bulging.      •Follow approved zaira procedures.      •Heat canned foods thoroughly before you taste them.      •Drink bottled or sterile water when you travel.        Get help right away if:    •You have difficulty breathing, swallowing, talking, or moving.      •You develop blurred vision.      •You cannot eat or drink without vomiting.      •You faint.      •Your eyes turn yellow.      •Your vomiting or diarrhea is persistent.      •Abdominal pain develops, increases, or localizes in one small area.      •You have a fever.      •You have blood or mucus in your stools, or your stools look dark black and tarry.    •You have signs of dehydration, such as:  •Dark urine, very little urine, or no urine.      •Cracked lips.      •Not making tears while crying.      •Dry mouth.      •Sunken eyes.      •Sleepiness.      •Weakness.      •Dizziness.        These symptoms may represent a serious problem that is an emergency. Do not wait to see if the symptoms will go away. Get medical help right away. Call your local emergency services (911 in the [place] Do not drive yourself to the hospital.       Summary    •Food poisoning is an illness that is caused by eating or drinking contaminated foods or drinks.      •Symptoms may include nausea, vomiting, diarrhea, muscle aches, cramping, fever, chills, and dehydration.      •In most cases, food poisoning is mild and lasts 1–2 days.      •In severe cases, hospitalization may be required.

## 2021-12-01 NOTE — ED ADULT TRIAGE NOTE - BRAND OF COVID-19 VACCINATION
FAMILY MEDICINE PROGRESS NOTE  NAME: Michelle Child  : 1954  MRN: [id number]     LOS: 9 days   Full Code  PROVIDER OF SERVICE:     Chief Complaint:  Pneumonia of left lower lobe due to Streptococcus pneumoniae    Subjective     Interval History:  History taken from: patient family RN  Subjective: Patient is a 62 yo  female who was admitted with pneumonia.  She is having a hard time talking and being understood due to the laryngitis.  She is more awake today but cannot understand.    Review of Systems:   Review of Systems   Unable to perform ROS: Other   Constitutional: Positive for activity change, appetite change and fatigue.   HENT: Positive for mouth sores, trouble swallowing and voice change.    Respiratory: Positive for cough. Negative for shortness of breath.    Cardiovascular: Negative for chest pain, palpitations and leg swelling.   Gastrointestinal: Negative for abdominal pain, diarrhea, nausea and vomiting.   Genitourinary: Positive for dysuria.   Neurological: Positive for weakness and headaches.   Psychiatric/Behavioral: Positive for confusion.       Objective     Vital Signs  Temp:  [98.4 °F (36.9 °C)-98.7 °F (37.1 °C)] 98.4 °F (36.9 °C)  Heart Rate:  [75-86] 82  Resp:  [16-20] 16  BP: ()/(46-64) 98/46    Physical Exam  Physical Exam   Constitutional: She appears well-developed and well-nourished. No distress.   HENT:   Mouth/Throat: Oropharyngeal exudate present.   Mucosa inflamed, erythema, patches   Neck: No JVD present.   Cardiovascular: Normal rate, regular rhythm and intact distal pulses.  Exam reveals no gallop and no friction rub.    Murmur heard.  Pulmonary/Chest: No respiratory distress. She has no wheezes. She has no rales.   Diminished breath sounds in the bases   Abdominal: Soft. Bowel sounds are normal. She exhibits no distension. There is no tenderness. There is no rebound and no guarding.   Musculoskeletal: She exhibits no edema.   Neurological: She is alert.    Skin: She is not diaphoretic.   Nursing note and vitals reviewed.      Medication Review    Current Facility-Administered Medications:   •  acetaminophen (TYLENOL) tablet 650 mg, 650 mg, Oral, Q4H PRN, Coral Berry MD, 650 mg at 01/18/18 0119  •  aspirin chewable tablet 81 mg, 81 mg, Oral, Daily, Coral Berry MD, 81 mg at 01/19/18 0901  •  bisacodyl (DULCOLAX) EC tablet 5 mg, 5 mg, Oral, Daily PRN, Coral Berry MD  •  doxycycline (VIBRAMYCIN) 100 mg/250 mL 0.9% NS VTB, 100 mg, Intravenous, Q12H, Bridget Ortega MD, 100 mg at 01/19/18 0901  •  famotidine (PEPCID) tablet 40 mg, 40 mg, Oral, Daily, Coral Berry MD, 40 mg at 01/19/18 0900  •  ferrous sulfate EC tablet 324 mg, 324 mg, Oral, BID With Meals, Coral Berry MD, 324 mg at 01/19/18 0900  •  FLUoxetine (PROzac) capsule 60 mg, 60 mg, Oral, Daily, Coral Berry MD, 60 mg at 01/19/18 0859  •  folic acid (FOLVITE) tablet 1 mg, 1 mg, Oral, Daily, Coral Berry MD, 1 mg at 01/19/18 0859  •  furosemide (LASIX) injection 40 mg, 40 mg, Intravenous, Once, Bridget Ortega MD  •  HYDROcodone-acetaminophen (NORCO) 5-325 MG per tablet 1 tablet, 1 tablet, Oral, Q4H PRN, Bridget Ortega MD, 1 tablet at 01/18/18 2050  •  influenza vac split quad (FLUZONE QUADRIVALENT) IM suspension 0.5 mL, 0.5 mL, Intramuscular, During Hospitalization, Coral Berry MD  •  ipratropium-albuterol (DUO-NEB) nebulizer solution 3 mL, 3 mL, Nebulization, 4x Daily - RT, Coral Berry MD, 3 mL at 01/19/18 1538  •  losartan (COZAAR) tablet 50 mg, 50 mg, Oral, Q24H, Coral Berry MD, 50 mg at 01/18/18 0909  •  magic mouthwash with nystatin oral supsension 5 mL, 5 mL, Swish & Swallow, 4x Daily, Bridget Ortega MD, 5 mL at 01/19/18 1200  •  metoprolol succinate XL (TOPROL-XL) 24 hr tablet 25 mg, 25 mg, Oral, Q24H, Stevenson Brown MD PhD, 25 mg at 01/18/18 0909  •  nicotine (NICODERM CQ) 21 MG/24HR patch 1 patch, 1 patch, Transdermal, Q24H, Coral Berry,  MD, 1 patch at 01/19/18 1537  •  ondansetron (ZOFRAN) tablet 4 mg, 4 mg, Oral, Q6H PRN, Coral Berry MD, 4 mg at 01/15/18 1417  •  pneumococcal polysaccharide 23-valent (PNEUMOVAX-23) vaccine 0.5 mL, 0.5 mL, Intramuscular, During Hospitalization, Coral Berry MD  •  potassium chloride (MICRO-K) CR capsule 40 mEq, 40 mEq, Oral, BID With Meals, Bridget Ortega MD, 40 mEq at 01/19/18 0901  •  potassium chloride 10 mEq in 100 mL IVPB, 10 mEq, Intravenous, Q1H PRN, Sunny Linn DO  •  risperiDONE (risperDAL) tablet 1 mg, 1 mg, Oral, Nightly, Coral Berry MD, 1 mg at 01/18/18 2050  •  sodium chloride 0.9 % flush 1-10 mL, 1-10 mL, Intravenous, PRN, Coral Berry MD  •  sodium chloride 0.9 % flush 10 mL, 10 mL, Intravenous, PRN, Toro Shine MD, 10 mL at 01/15/18 2048     Diagnostic Data      I reviewed the patient's new clinical results and imaging.      Assessment/Plan     Active Hospital Problems (** Indicates Principal Problem)    Diagnosis Date Noted   • **Pneumonia of left lower lobe due to Streptococcus pneumoniae [J13] 01/11/2018     On Levaquin with falling WBC and no fever.     • Closed fracture of multiple ribs of right side [S22.41XA] 01/19/2018   • Acute retention of urine [R33.8] 01/19/2018   • Bacterial cystitis [N30.80] 01/18/2018   • Oral thrush [B37.0] 01/17/2018   • Laryngitis, acute [J04.0] 01/17/2018   • Dysuria [R30.0] 01/17/2018   • Hypoxic encephalopathy [G93.1] 01/16/2018     Patient still requiring nasal cannula or venturi mask.  Last ABG on NC still showed significant hypoxia     • Rheumatic mitral stenosis [I05.0] 01/15/2018     Cards Consultation     • Acute diastolic CHF (congestive heart failure) [I50.31] 01/15/2018     Lasix  Cards consultation       • Encephalopathy, metabolic [G93.41] 01/15/2018     Replace electrolytes   Replace Folic Acid     • Pulmonary hypertension due to left heart valvular disease [I27.22, I38] 01/15/2018   • Cor pulmonale, chronic  [I27.81] 01/15/2018   • Folic acid deficiency [E53.8] 01/15/2018     Replace orally     • Pulmonary hypertension [I27.20] 01/14/2018     Cards appreciated.      • Rheumatic tricuspid valve regurgitation [I07.1] 01/14/2018   • Hyponatremia [E87.1] 01/13/2018     Fluctuating  Monitor closely     • Cerebral microvascular disease [I67.9] 01/13/2018     Per CT head  Patient more confused today with slurring of speech. Have to consider acute CVA.   Will obtain MRI brain.      • Hypokalemia [E87.6] 01/10/2018     Replace Orally and IV  Also replace magnesium       • Weakness [R53.1] 01/10/2018     PT/OT     • Acute respiratory failure with hypoxia [J96.01] 01/10/2018     NC oxygen, steroids, and neb treatements     • Fall with injury [W19.XXXA] 08/15/2017     PT/OT       • Tobacco dependence [F17.200]      Nicotine patch 21mg transdermally     • Gastroesophageal reflux disease [K21.9]      Pepcid     • Iron deficiency anemia [D50.9]      Continue feosol        Resolved Hospital Problems    Diagnosis Date Noted Date Resolved   • Anemia [D64.9] 01/14/2018 01/15/2018     Start iron supplementation.  Obtain iron studies.      • Elevated brain natriuretic peptide (BNP) level [R79.89] 01/14/2018 01/15/2018     IVF's discontinued, Fluid Restrictions, Low Na diet. Echocardiogram ordered. Will start Lasix 20 mg po bid and monitor.     • Elevated troponin [R74.8] 01/14/2018 01/15/2018     Most likely secondary to elevated BNP. No chest pain or anginal equivalent. Will monitor.     • Family dynamics problem [Z63.9] 01/14/2018 01/16/2018     Will discuss with case case management.      • Positive D dimer [R79.89] 01/14/2018 01/15/2018     Most likely elevated due to pneumonia and elevated BNP. No clinical signs of PE.     • Hyperkalemia [E87.5] 01/13/2018 01/14/2018     Decrease KCL and monitor     • Community acquired bacterial pneumonia [J15.9] 01/10/2018 01/11/2018     Left lower lobe  Blood culture, sputum culture, check  atypicals.   Levaquin as PCN allergy     • Hyponatremia [E87.1] 01/10/2018 01/11/2018     IV fluids     • Essential hypertension [I10]  01/14/2018     Losartan, coreg     • Depressive disorder [F32.9]  01/14/2018     Prozac, Elavil, and Risperdal       CTA was ordered due to worsening breathing in the night.  She required a venturi.  Give IV Lasix for fluid.  She had acute urinary retention and a hutchins was anchored with 1200 cc drained.  Continue therapy.  Family made aware possible discharge over the weekend, if not, then will try Monday.  Ensure vanilla added.    DVT prophylaxis: SCDs/TEDs      Disposition:Nursing Home          This document has been electronically signed by Bridget Ortega MD on January 19, 2018 4:48 PM          This document has been electronically signed by Bridget rOtega MD on January 19, 2018 4:48 PM         Pfizer dose 1 and 2

## 2021-12-01 NOTE — ED ADULT TRIAGE NOTE - CHIEF COMPLAINT QUOTE
Pt presents to er with complaints of emesis/diarrhea starting at 3am,  denies fevers, abd pain at this time. Pt now reports she has spots of blood in her stool and when she has episodes of emesis. Pt presents to er with complaints of emesis/diarrhea starting at 3am,  denies fevers, abd pain at this time. Pt now reports she has spots of bright blood in her stool and when she has episodes of emesis.

## 2021-12-02 LAB
C DIFF BY PCR RESULT: SIGNIFICANT CHANGE UP
C DIFF TOX GENS STL QL NAA+PROBE: SIGNIFICANT CHANGE UP
CULTURE RESULTS: SIGNIFICANT CHANGE UP
SPECIMEN SOURCE: SIGNIFICANT CHANGE UP

## 2021-12-02 NOTE — ED POST DISCHARGE NOTE - DETAILS
Patient returned call to ED and reported results. Patient states she is feeling better. Agreed to Rx. Keflex and F/U with PMD. MTangredi NP

## 2022-04-20 ENCOUNTER — TRANSCRIPTION ENCOUNTER (OUTPATIENT)
Age: 22
End: 2022-04-20

## 2022-07-11 PROBLEM — F41.9 ANXIETY DISORDER, UNSPECIFIED: Chronic | Status: ACTIVE | Noted: 2021-12-01

## 2022-07-12 ENCOUNTER — APPOINTMENT (OUTPATIENT)
Dept: INTERNAL MEDICINE | Facility: CLINIC | Age: 22
End: 2022-07-12

## 2022-07-12 VITALS
HEART RATE: 85 BPM | WEIGHT: 104 LBS | RESPIRATION RATE: 16 BRPM | BODY MASS INDEX: 19.14 KG/M2 | HEIGHT: 62 IN | TEMPERATURE: 98.9 F | DIASTOLIC BLOOD PRESSURE: 52 MMHG | SYSTOLIC BLOOD PRESSURE: 104 MMHG | OXYGEN SATURATION: 98 %

## 2022-07-12 DIAGNOSIS — Z87.898 PERSONAL HISTORY OF OTHER SPECIFIED CONDITIONS: ICD-10-CM

## 2022-07-12 DIAGNOSIS — Z01.84 ENCOUNTER FOR ANTIBODY RESPONSE EXAMINATION: ICD-10-CM

## 2022-07-12 DIAGNOSIS — Z79.899 OTHER LONG TERM (CURRENT) DRUG THERAPY: ICD-10-CM

## 2022-07-12 DIAGNOSIS — R59.0 LOCALIZED ENLARGED LYMPH NODES: ICD-10-CM

## 2022-07-12 DIAGNOSIS — L98.9 DISORDER OF THE SKIN AND SUBCUTANEOUS TISSUE, UNSPECIFIED: ICD-10-CM

## 2022-07-12 DIAGNOSIS — Z13.1 ENCOUNTER FOR SCREENING FOR DIABETES MELLITUS: ICD-10-CM

## 2022-07-12 DIAGNOSIS — U07.1 COVID-19: ICD-10-CM

## 2022-07-12 DIAGNOSIS — Z86.018 PERSONAL HISTORY OF OTHER BENIGN NEOPLASM: ICD-10-CM

## 2022-07-12 DIAGNOSIS — Z87.2 PERSONAL HISTORY OF DISEASES OF THE SKIN AND SUBCUTANEOUS TISSUE: ICD-10-CM

## 2022-07-12 DIAGNOSIS — N39.3 STRESS INCONTINENCE (FEMALE) (MALE): ICD-10-CM

## 2022-07-12 DIAGNOSIS — Z11.1 ENCOUNTER FOR SCREENING FOR RESPIRATORY TUBERCULOSIS: ICD-10-CM

## 2022-07-12 DIAGNOSIS — Z13.220 ENCOUNTER FOR SCREENING FOR LIPOID DISORDERS: ICD-10-CM

## 2022-07-12 DIAGNOSIS — Z11.4 ENCOUNTER FOR SCREENING FOR HUMAN IMMUNODEFICIENCY VIRUS [HIV]: ICD-10-CM

## 2022-07-12 DIAGNOSIS — J30.2 OTHER SEASONAL ALLERGIC RHINITIS: ICD-10-CM

## 2022-07-12 DIAGNOSIS — Z13.29 ENCOUNTER FOR SCREENING FOR OTHER SUSPECTED ENDOCRINE DISORDER: ICD-10-CM

## 2022-07-12 DIAGNOSIS — Z86.39 PERSONAL HISTORY OF OTHER ENDOCRINE, NUTRITIONAL AND METABOLIC DISEASE: ICD-10-CM

## 2022-07-12 DIAGNOSIS — R41.840 ATTENTION AND CONCENTRATION DEFICIT: ICD-10-CM

## 2022-07-12 PROCEDURE — 99385 PREV VISIT NEW AGE 18-39: CPT | Mod: 25

## 2022-07-12 PROCEDURE — 96127 BRIEF EMOTIONAL/BEHAV ASSMT: CPT

## 2022-07-12 PROCEDURE — 36415 COLL VENOUS BLD VENIPUNCTURE: CPT

## 2022-07-12 RX ORDER — SPIRONOLACTONE 50 MG/1
50 TABLET ORAL TWICE DAILY
Qty: 60 | Refills: 4 | Status: DISCONTINUED | COMMUNITY
Start: 2021-08-12 | End: 2022-07-12

## 2022-07-12 RX ORDER — NORETHINDRONE ACETATE AND ETHINYL ESTRADIOL 1; 20 MG/1; UG/1
1-20 TABLET ORAL
Refills: 0 | Status: COMPLETED | COMMUNITY
End: 2022-07-12

## 2022-07-12 RX ORDER — TRETINOIN 0.25 MG/G
0.03 CREAM TOPICAL
Qty: 1 | Refills: 2 | Status: DISCONTINUED | COMMUNITY
Start: 2021-08-12 | End: 2022-07-12

## 2022-07-12 NOTE — HEALTH RISK ASSESSMENT
[0] : 2) Feeling down, depressed, or hopeless: Not at all (0) [PHQ-2 Negative - No further assessment needed] : PHQ-2 Negative - No further assessment needed [Sunscreen] : uses sunscreen [HIV Test offered] : HIV Test offered [Hepatitis C test offered] : Hepatitis C test offered [Smoke Detector] : smoke detector [Carbon Monoxide Detector] : carbon monoxide detector [Seat Belt] :  uses seat belt [Feels Safe at Home] : Feels safe at home [[id number]] : 0 [Guns at Home] : no guns at home [PapSmearComments] : no hx prior

## 2022-07-12 NOTE — PHYSICAL EXAM
[No Acute Distress] : no acute distress [Well-Appearing] : well-appearing [Normal Sclera/Conjunctiva] : normal sclera/conjunctiva [PERRL] : pupils equal round and reactive to light [EOMI] : extraocular movements intact [Normal Outer Ear/Nose] : the outer ears and nose were normal in appearance [Normal Oropharynx] : the oropharynx was normal [Normal TMs] : both tympanic membranes were normal [Normal Nasal Mucosa] : the nasal mucosa was normal [Supple] : supple [Thyroid Normal, No Nodules] : the thyroid was normal and there were no nodules present [No Respiratory Distress] : no respiratory distress  [Clear to Auscultation] : lungs were clear to auscultation bilaterally [Normal Rate] : normal rate  [Regular Rhythm] : with a regular rhythm [Normal S1, S2] : normal S1 and S2 [No Murmur] : no murmur heard [Pedal Pulses Present] : the pedal pulses are present [No Edema] : there was no peripheral edema [Soft] : abdomen soft [Non Tender] : non-tender [No HSM] : no HSM [Normal Supraclavicular Nodes] : no supraclavicular lymphadenopathy [Normal Posterior Cervical Nodes] : no posterior cervical lymphadenopathy [No CVA Tenderness] : no CVA  tenderness [No Spinal Tenderness] : no spinal tenderness [No Joint Swelling] : no joint swelling [Grossly Normal Strength/Tone] : grossly normal strength/tone [No Rash] : no rash [No Focal Deficits] : no focal deficits [Normal Gait] : normal gait [Normal Affect] : the affect was normal [Alert and Oriented x3] : oriented to person, place, and time [de-identified] : no sinus tenderness [de-identified] : left anterior neck mobile, rubbery enlarged lymph node with min focal tenderness, no rash [de-identified] : scattered freckles, +belly piercing

## 2022-07-12 NOTE — ASSESSMENT
[FreeTextEntry1] : \par 20 yo F pmhx acne, hyperhidrosis, hx covid infection 1/21 (s/p inpt stay with ARF and supp O2 need), low vit d here as new pt for CPE\par \par left neck enlarged LN- since 1/21, stable. ?Hx unintentional wt loss over past year.  No evidence of infection clinically.\par -check US neck\par -check LDH\par -advised may need biopsy after above\par -advised to f/u if sx's worsen or new sx's arise\par \par hyperhidrosis- gets periodic Botox with good control reported\par -followed by derm\par -advised yearly derm screening exam.  Regular use of sun block for skin cancer prevention advised.\par \par hx low vit d-\par -check level\par \par MISC:  Continued social distancing and measure for covid19 prevention encouraged.  \par -hx pfizer series, hx booster 2/22\par \par \par HCM\par -check screening labs; agreeable for STD/HIV screening\par -STD and pregnancy prevention counseled\par -hx flu shot 9/21\par -hx Tdap 2020\par -hx hep B series\par -hx HPV series\par -no hx PAP, has GYN f/u 7/22\par -yearly dental screening advised\par -yearly eye screening advised\par \par Pt's cell: [phone]\par \par Labs drawn in office today.\par

## 2022-07-12 NOTE — HISTORY OF PRESENT ILLNESS
[Premenopausal] : the patient is premenopausal [Health Maintenance] : health maintenance [Mother] : mother [Unmarried] : unmarried [Working Part-Time] : working part-time [Occupation ___] : occupation: [unfilled] [Never Smoked Cigarettes] : has never smoked cigarettes [Occasional Use] : occasional alcohol use [Never] : has never used illicit drugs [Reg. Dental Visits] : She has regular dental visits [FreeTextEntry1] : \par Needs new PMD. 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  Denies change is size since first noted.\par -notes told due to covid infection, no hx imaging\par -Denies fever, chills, URI sx's, ear sx's, tooth pain, throat pain, cough or sob.\par \par Reports is socially distancing and using precautions for covid prevention.\par Denies sick or covid positive contacts.\par -hx covid infection 1/21 (s/p inpt stay with ARF and short term supp O2 need)\par -hx pfizer series, hx booster 2/22\par \par Reports stable wt in past year, but per records has lost ~ 10 lbs since 4/21\par Eating healthy, reports doing so for some time now, eating 3x/d regularly\par Exercise: cardio machines at gym 1-2x/wk x 30 mins- done w/o sx's or limitations (started 6 mo ago)\par \par Reports nl appetite and BMs.\par Denies GI complaints.\par \par Denies  complaints.\par -sexually active with girlfriend x 2 yrs, monogamous; hx sex with men in past\par -denies hx STD dx\par \par hyperhidrosis-  gets periodic Botox with good control reported\par -followed by derm\par \par Denies depression or anxiety.\par -reports home/school life going well\par \par hx low vit d- not on OTC supp\par

## 2022-07-13 LAB
25(OH)D3 SERPL-MCNC: 39.4 NG/ML
ABO + RH PNL BLD: NORMAL
ALBUMIN SERPL ELPH-MCNC: 4.7 G/DL
ALP BLD-CCNC: 88 U/L
ALT SERPL-CCNC: 18 U/L
ANION GAP SERPL CALC-SCNC: 16 MMOL/L
AST SERPL-CCNC: 16 U/L
BASOPHILS # BLD AUTO: 0.03 K/UL
BASOPHILS NFR BLD AUTO: 0.4 %
BILIRUB SERPL-MCNC: 0.4 MG/DL
BUN SERPL-MCNC: 12 MG/DL
C TRACH RRNA SPEC QL NAA+PROBE: NOT DETECTED
CALCIUM SERPL-MCNC: 10 MG/DL
CHLORIDE SERPL-SCNC: 101 MMOL/L
CHOLEST SERPL-MCNC: 188 MG/DL
CO2 SERPL-SCNC: 22 MMOL/L
CREAT SERPL-MCNC: 0.59 MG/DL
EGFR: 131 ML/MIN/1.73M2
EOSINOPHIL # BLD AUTO: 0.06 K/UL
EOSINOPHIL NFR BLD AUTO: 0.8 %
ESTIMATED AVERAGE GLUCOSE: 97 MG/DL
GLUCOSE SERPL-MCNC: 101 MG/DL
HBA1C MFR BLD HPLC: 5 %
HBV CORE IGG+IGM SER QL: NONREACTIVE
HBV SURFACE AB SER QL: REACTIVE
HBV SURFACE AG SER QL: NONREACTIVE
HCT VFR BLD CALC: 40.6 %
HCV AB SER QL: NONREACTIVE
HCV S/CO RATIO: 0.13 S/CO
HDLC SERPL-MCNC: 78 MG/DL
HGB BLD-MCNC: 13.2 G/DL
HIV1+2 AB SPEC QL IA.RAPID: NONREACTIVE
IMM GRANULOCYTES NFR BLD AUTO: 0.8 %
LDH SERPL-CCNC: 153 U/L
LDLC SERPL CALC-MCNC: 96 MG/DL
LYMPHOCYTES # BLD AUTO: 1.46 K/UL
LYMPHOCYTES NFR BLD AUTO: 19.8 %
MAN DIFF?: NORMAL
MCHC RBC-ENTMCNC: 30.3 PG
MCHC RBC-ENTMCNC: 32.5 GM/DL
MCV RBC AUTO: 93.1 FL
MONOCYTES # BLD AUTO: 0.43 K/UL
MONOCYTES NFR BLD AUTO: 5.8 %
N GONORRHOEA RRNA SPEC QL NAA+PROBE: NOT DETECTED
NEUTROPHILS # BLD AUTO: 5.34 K/UL
NEUTROPHILS NFR BLD AUTO: 72.4 %
NONHDLC SERPL-MCNC: 110 MG/DL
PLATELET # BLD AUTO: 276 K/UL
POTASSIUM SERPL-SCNC: 4.1 MMOL/L
PROT SERPL-MCNC: 7.2 G/DL
RBC # BLD: 4.36 M/UL
RBC # FLD: 12.8 %
SODIUM SERPL-SCNC: 139 MMOL/L
SOURCE AMPLIFICATION: NORMAL
T PALLIDUM AB SER QL IA: NEGATIVE
TRIGL SERPL-MCNC: 68 MG/DL
TSH SERPL-ACNC: 1.21 UIU/ML
WBC # FLD AUTO: 7.38 K/UL

## 2022-07-14 ENCOUNTER — NON-APPOINTMENT (OUTPATIENT)
Age: 22
End: 2022-07-14

## 2022-07-16 ENCOUNTER — OUTPATIENT (OUTPATIENT)
Dept: OUTPATIENT SERVICES | Facility: HOSPITAL | Age: 22
LOS: 1 days | End: 2022-07-16
Payer: COMMERCIAL

## 2022-07-16 ENCOUNTER — APPOINTMENT (OUTPATIENT)
Dept: ULTRASOUND IMAGING | Facility: CLINIC | Age: 22
End: 2022-07-16

## 2022-07-16 DIAGNOSIS — R59.0 LOCALIZED ENLARGED LYMPH NODES: ICD-10-CM

## 2022-07-16 PROCEDURE — 76536 US EXAM OF HEAD AND NECK: CPT

## 2022-07-16 PROCEDURE — 76536 US EXAM OF HEAD AND NECK: CPT | Mod: 26

## 2022-07-25 ENCOUNTER — NON-APPOINTMENT (OUTPATIENT)
Age: 22
End: 2022-07-25

## 2022-07-27 DIAGNOSIS — Z11.1 ENCOUNTER FOR SCREENING FOR RESPIRATORY TUBERCULOSIS: ICD-10-CM

## 2022-08-02 ENCOUNTER — APPOINTMENT (OUTPATIENT)
Dept: INTERNAL MEDICINE | Facility: CLINIC | Age: 22
End: 2022-08-02

## 2022-08-02 DIAGNOSIS — Z23 ENCOUNTER FOR IMMUNIZATION: ICD-10-CM

## 2022-08-03 ENCOUNTER — APPOINTMENT (OUTPATIENT)
Dept: OTOLARYNGOLOGY | Facility: CLINIC | Age: 22
End: 2022-08-03

## 2022-08-04 ENCOUNTER — NON-APPOINTMENT (OUTPATIENT)
Age: 22
End: 2022-08-04

## 2022-08-04 ENCOUNTER — MED ADMIN CHARGE (OUTPATIENT)
Age: 22
End: 2022-08-04

## 2022-08-04 ENCOUNTER — APPOINTMENT (OUTPATIENT)
Dept: INTERNAL MEDICINE | Facility: CLINIC | Age: 22
End: 2022-08-04

## 2022-08-04 LAB
M TB IFN-G BLD-IMP: NEGATIVE
QUANTIFERON TB PLUS MITOGEN MINUS NIL: 9.47 IU/ML
QUANTIFERON TB PLUS NIL: 0.02 IU/ML
QUANTIFERON TB PLUS TB1 MINUS NIL: 0.01 IU/ML
QUANTIFERON TB PLUS TB2 MINUS NIL: 0.01 IU/ML

## 2022-08-04 PROCEDURE — 90715 TDAP VACCINE 7 YRS/> IM: CPT

## 2022-08-04 PROCEDURE — 90471 IMMUNIZATION ADMIN: CPT

## 2022-08-06 LAB — HBV SURFACE AB SERPL IA-ACNC: >1000 MIU/ML

## 2022-08-08 ENCOUNTER — NON-APPOINTMENT (OUTPATIENT)
Age: 22
End: 2022-08-08

## 2022-08-09 PROBLEM — L70.0 ACNE VULGARIS: Status: ACTIVE | Noted: 2020-01-28

## 2022-08-10 ENCOUNTER — APPOINTMENT (OUTPATIENT)
Dept: DERMATOLOGY | Facility: CLINIC | Age: 22
End: 2022-08-10

## 2022-08-10 DIAGNOSIS — L70.0 ACNE VULGARIS: ICD-10-CM

## 2022-08-10 NOTE — ASSESSMENT
[FreeTextEntry1] : Risks and benefits of botulinum toxin for hyperhidrosis were discussed.\par Intradermal injection of Botox was performed.\par Total volume injected:  6 cc/ 200 U (100 U per side)\par Aluminum chloride was used for hemostasis\par The patient tolerated the procedure well.\par \par multiple benign nevi and lentigines\par

## 2022-08-16 ENCOUNTER — NON-APPOINTMENT (OUTPATIENT)
Age: 22
End: 2022-08-16

## 2022-08-16 ENCOUNTER — APPOINTMENT (OUTPATIENT)
Dept: OTOLARYNGOLOGY | Facility: CLINIC | Age: 22
End: 2022-08-16

## 2022-08-16 DIAGNOSIS — R59.0 LOCALIZED ENLARGED LYMPH NODES: ICD-10-CM

## 2022-08-16 DIAGNOSIS — R05.9 COUGH, UNSPECIFIED: ICD-10-CM

## 2022-08-16 PROCEDURE — 99203 OFFICE O/P NEW LOW 30 MIN: CPT | Mod: 25

## 2022-08-16 PROCEDURE — 31576 LARYNGOSCOPY WITH BIOPSY: CPT

## 2022-08-16 NOTE — HISTORY OF PRESENT ILLNESS
[None] : No associated symptoms are reported. [de-identified] : 22 yo female who is being referred by Dr. Sexton for cervical lymphadenopathy. Reports having swelling for about 1 1/2 year, since after she had COvid19 , but no discomfort. Now for a couple of months she is staring to feel pressure and pain at touch. Unintentional weight loss, 15lbs since last year. Denies night sweats. \par Denies dysphagia, dysphonia and or dyspnea.  \par 7/16/2022 Neck ultrasound showing palpable abnormality corresponds to 1.8 x 1.0 cm left level 2 lymph node without evidence of abnormal morphological features. No biopsy done.\par Takes bilateral axillary Botox for loredo hyperhidrosis for about 1 year. \par Denies history of smoking. 2 alcohol drinks a week on occasion.  \par \par pt notes her wt loss and cough, originating in chest, began w her seconf case covid and has improved but not resolved. The wt has been stable. The prominent L neck node has been stable.  \par

## 2022-08-16 NOTE — CONSULT LETTER
[Consult Letter:] : I had the pleasure of evaluating your patient, [unfilled]. [Please see my note below.] : Please see my note below. [Consult Closing:] : Thank you very much for allowing me to participate in the care of this patient.  If you have any questions, please do not hesitate to contact me. [Sincerely,] : Sincerely, [FreeTextEntry2] : Kyung Sexton MD ( [place], [place]) [FreeTextEntry3] : Juvenal Winters MD, FACS\par \par    [facility] [place]\par Associate Chair\par    Department of Otolaryngology\par \par Professor\par Otolaryngology & Molecular Medicine\par [facility] [employer]\par

## 2022-08-16 NOTE — PHYSICAL EXAM
[FreeTextEntry1] : shotty L JARAD node.  Siomilar but small R JARAD node [Midline] : trachea located in midline position [Normal] : no rashes

## 2022-08-16 NOTE — PROCEDURE
[None] : none [Flexible Endoscope] : examined with the flexible endoscope [Normal] : normal [de-identified] : jodi MENCHACA [de-identified] : cough

## 2022-10-11 ENCOUNTER — RX RENEWAL (OUTPATIENT)
Age: 22
End: 2022-10-11

## 2023-01-13 ENCOUNTER — APPOINTMENT (OUTPATIENT)
Dept: GASTROENTEROLOGY | Facility: CLINIC | Age: 23
End: 2023-01-13

## 2023-03-22 ENCOUNTER — APPOINTMENT (OUTPATIENT)
Dept: OBGYN | Facility: CLINIC | Age: 23
End: 2023-03-22

## 2023-07-01 ENCOUNTER — NON-APPOINTMENT (OUTPATIENT)
Age: 23
End: 2023-07-01

## 2023-07-12 ENCOUNTER — APPOINTMENT (OUTPATIENT)
Dept: GASTROENTEROLOGY | Facility: CLINIC | Age: 23
End: 2023-07-12
Payer: COMMERCIAL

## 2023-07-12 ENCOUNTER — NON-APPOINTMENT (OUTPATIENT)
Age: 23
End: 2023-07-12

## 2023-07-12 VITALS
HEIGHT: 62 IN | WEIGHT: 105 LBS | BODY MASS INDEX: 19.32 KG/M2 | SYSTOLIC BLOOD PRESSURE: 92 MMHG | HEART RATE: 74 BPM | DIASTOLIC BLOOD PRESSURE: 63 MMHG

## 2023-07-12 DIAGNOSIS — R10.11 RIGHT UPPER QUADRANT PAIN: ICD-10-CM

## 2023-07-12 PROCEDURE — 99203 OFFICE O/P NEW LOW 30 MIN: CPT

## 2023-07-12 NOTE — ASSESSMENT
[FreeTextEntry1] : 23 yo female with history of current bouts of right upper quadrant discomfort.  Patient advised to stay on low-fat diet.  Obtain abdominal ultrasound.  Trial of hyoscyamine.  Follow up in one month to assess.

## 2023-07-12 NOTE — PHYSICAL EXAM
[Alert] : alert [Normal Voice/Communication] : normal voice/communication [Healthy Appearing] : healthy appearing [No Acute Distress] : no acute distress [Sclera] : the sclera and conjunctiva were normal [Hearing Threshold Finger Rub Not [place]] : hearing was normal [Normal Lips/Gums] : the lips and gums were normal [Oropharynx] : the oropharynx was normal [Normal Appearance] : the appearance of the neck was normal [No Neck Mass] : no neck mass was observed [No Respiratory Distress] : no respiratory distress [No Acc Muscle Use] : no accessory muscle use [Respiration, Rhythm And Depth] : normal respiratory rhythm and effort [Auscultation Breath Sounds / Voice Sounds] : lungs were clear to auscultation bilaterally [Heart Rate And Rhythm] : heart rate was normal and rhythm regular [Normal S1, S2] : normal S1 and S2 [Murmurs] : no murmurs [Bowel Sounds] : normal bowel sounds [No Masses] : no abdominal mass palpated [Abdomen Soft] : soft [] : no hepatosplenomegaly [Oriented To Time, Place, And Person] : oriented to person, place, and time [de-identified] : tender in RUQ without rebound

## 2023-07-12 NOTE — HISTORY OF PRESENT ILLNESS
[FreeTextEntry1] : Ms. MELANY CAPPS is a 22 year old female with history of current right upper quadrant discomfort.  Patient has noted upper abdominal discomfort that she describes as "gas pain" patient went to the emergency room with these complaints and was given Gas-X with improvement.  There is no diarrhea nausea vomiting or weight loss.  Symptoms can be nocturnal.\par

## 2023-07-18 ENCOUNTER — APPOINTMENT (OUTPATIENT)
Dept: ULTRASOUND IMAGING | Facility: CLINIC | Age: 23
End: 2023-07-18

## 2023-08-15 ENCOUNTER — APPOINTMENT (OUTPATIENT)
Dept: GASTROENTEROLOGY | Facility: CLINIC | Age: 23
End: 2023-08-15

## 2023-09-27 ENCOUNTER — APPOINTMENT (OUTPATIENT)
Dept: DERMATOLOGY | Facility: CLINIC | Age: 23
End: 2023-09-27

## 2023-12-07 ENCOUNTER — RX RENEWAL (OUTPATIENT)
Age: 23
End: 2023-12-07

## 2023-12-18 ENCOUNTER — NON-APPOINTMENT (OUTPATIENT)
Age: 23
End: 2023-12-18

## 2024-01-19 ENCOUNTER — APPOINTMENT (OUTPATIENT)
Dept: INTERNAL MEDICINE | Facility: CLINIC | Age: 24
End: 2024-01-19
Payer: COMMERCIAL

## 2024-01-19 ENCOUNTER — NON-APPOINTMENT (OUTPATIENT)
Age: 24
End: 2024-01-19

## 2024-01-19 VITALS
TEMPERATURE: 98.4 F | HEART RATE: 71 BPM | DIASTOLIC BLOOD PRESSURE: 76 MMHG | BODY MASS INDEX: 19.88 KG/M2 | HEIGHT: 62 IN | SYSTOLIC BLOOD PRESSURE: 102 MMHG | WEIGHT: 108 LBS | OXYGEN SATURATION: 98 %

## 2024-01-19 DIAGNOSIS — F90.9 ATTENTION-DEFICIT HYPERACTIVITY DISORDER, UNSPECIFIED TYPE: ICD-10-CM

## 2024-01-19 DIAGNOSIS — Z02.1 ENCOUNTER FOR PRE-EMPLOYMENT EXAMINATION: ICD-10-CM

## 2024-01-19 DIAGNOSIS — Z00.00 ENCOUNTER FOR GENERAL ADULT MEDICAL EXAMINATION W/OUT ABNORMAL FINDINGS: ICD-10-CM

## 2024-01-19 PROCEDURE — 93000 ELECTROCARDIOGRAM COMPLETE: CPT

## 2024-01-19 PROCEDURE — 99214 OFFICE O/P EST MOD 30 MIN: CPT | Mod: 25

## 2024-01-19 PROCEDURE — 36415 COLL VENOUS BLD VENIPUNCTURE: CPT

## 2024-01-19 PROCEDURE — 99395 PREV VISIT EST AGE 18-39: CPT

## 2024-01-19 NOTE — HEALTH RISK ASSESSMENT
[Very Good] : ~his/her~ current health as very good [Yes] : Yes [Monthly or less (1 pt)] : Monthly or less (1 point) [0] : 2) Feeling down, depressed, or hopeless: Not at all (0) [[id number]] : 0 [EyeExamDate] : 2022 [Change in mental status noted] : No change in mental status noted [Language] : denies difficulty with language [College] : College [Reports changes in hearing] : Reports no changes in hearing [Reports changes in vision] : Reports no changes in vision [Reports changes in dental health] : Reports no changes in dental health [Smoke Detector] : smoke detector [Carbon Monoxide Detector] : carbon monoxide detector [Guns at Home] : no guns at home [Seat Belt] :  uses seat belt [Sunscreen] : uses sunscreen [PapSmearDate] : 2023 [AdvancecareDate] : 1/19/24 [Never] : Never

## 2024-01-19 NOTE — PHYSICAL EXAM
[No Acute Distress] : no acute distress [Normal Sclera/Conjunctiva] : normal sclera/conjunctiva [PERRL] : pupils equal round and reactive to light [Normal Outer Ear/Nose] : the outer ears and nose were normal in appearance [Normal Oropharynx] : the oropharynx was normal [No JVD] : no jugular venous distention [No Respiratory Distress] : no respiratory distress  [No Accessory Muscle Use] : no accessory muscle use [Clear to Auscultation] : lungs were clear to auscultation bilaterally [Normal Rate] : normal rate  [Regular Rhythm] : with a regular rhythm [Normal S1, S2] : normal S1 and S2 [No Edema] : there was no peripheral edema [Non Tender] : non-tender [Non-distended] : non-distended [Normal Anterior Cervical Nodes] : no anterior cervical lymphadenopathy [No CVA Tenderness] : no CVA  tenderness [No Spinal Tenderness] : no spinal tenderness [No Joint Swelling] : no joint swelling [Grossly Normal Strength/Tone] : grossly normal strength/tone [No Rash] : no rash [Coordination Grossly Intact] : coordination grossly intact [No Focal Deficits] : no focal deficits [Normal Gait] : normal gait [Deep Tendon Reflexes (DTR)] : deep tendon reflexes were 2+ and symmetric [Alert and Oriented x3] : oriented to person, place, and time [Normal Mood] : the mood was normal [Normal Insight/Judgement] : insight and judgment were intact

## 2024-01-19 NOTE — REVIEW OF SYSTEMS
[Abdominal Pain] : abdominal pain [Nausea] : no nausea [Constipation] : no constipation [Heartburn] : no heartburn [Melena] : no melena [Dysuria] : no dysuria [Hematuria] : no hematuria [Joint Pain] : no joint pain [Muscle Pain] : no muscle pain [Itching] : no itching [Headache] : no headache [Dizziness] : no dizziness [Fainting] : no fainting [Confusion] : no confusion [Memory Loss] : no memory loss [Unsteady Walking] : no ataxia [Suicidal] : not suicidal [Insomnia] : no insomnia [Anxiety] : no anxiety [Depression] : no depression [Easy Bleeding] : no easy bleeding [Easy Bruising] : no easy bruising [Negative] : Respiratory [de-identified] : cannot focus

## 2024-01-19 NOTE — ASSESSMENT
[FreeTextEntry1] : All preventative measures were reviewed with the patient and the patient is due for and agrees to the following as outlined  in the plan  below. Uptodate with pap, flu counselled on safe sex, screening, flu shots. F/U in 3 moths for medication renewal

## 2024-01-19 NOTE — HEALTH RISK ASSESSMENT
[Very Good] : ~his/her~ current health as very good [Yes] : Yes [Monthly or less (1 pt)] : Monthly or less (1 point) [0] : 2) Feeling down, depressed, or hopeless: Not at all (0) [[id number]] : 0 [EyeExamDate] : 2022 [Change in mental status noted] : No change in mental status noted [Language] : denies difficulty with language [College] : College [Reports changes in hearing] : Reports no changes in hearing [Reports changes in vision] : Reports no changes in vision [Reports changes in dental health] : Reports no changes in dental health [Smoke Detector] : smoke detector [Carbon Monoxide Detector] : carbon monoxide detector [Guns at Home] : no guns at home [Seat Belt] :  uses seat belt [Sunscreen] : uses sunscreen [PapSmearDate] : 2023 [AdvancecareDate] : 1/19/24 [Never] : Never

## 2024-01-19 NOTE — HISTORY OF PRESENT ILLNESS
[FreeTextEntry1] : cpe/ employment  labs/tutres [de-identified] :  22 y/o F pmhx acne, ADHD, hyperhidrosis, hx covid infection 1/21 (s/p inpt stay with ARF and supp O2 need), low vit d here for CPE  Last seen for Physical Dr Sexton 2022  No new changes in health, no acute complains.  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

## 2024-01-19 NOTE — HISTORY OF PRESENT ILLNESS
[FreeTextEntry1] : cpe/ employment  labs/tutres [de-identified] :  22 y/o F pmhx acne, ADHD, hyperhidrosis, hx covid infection 1/21 (s/p inpt stay with ARF and supp O2 need), low vit d here for CPE  Last seen for Physical Dr Sexton 2022  No new changes in health, no acute complains.  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

## 2024-01-19 NOTE — REVIEW OF SYSTEMS
[Abdominal Pain] : abdominal pain [Nausea] : no nausea [Constipation] : no constipation [Heartburn] : no heartburn [Melena] : no melena [Dysuria] : no dysuria [Hematuria] : no hematuria [Joint Pain] : no joint pain [Muscle Pain] : no muscle pain [Itching] : no itching [Headache] : no headache [Dizziness] : no dizziness [Fainting] : no fainting [Confusion] : no confusion [Memory Loss] : no memory loss [Unsteady Walking] : no ataxia [Suicidal] : not suicidal [Insomnia] : no insomnia [Anxiety] : no anxiety [Depression] : no depression [Easy Bleeding] : no easy bleeding [Easy Bruising] : no easy bruising [Negative] : Respiratory [de-identified] : cannot focus

## 2024-01-22 LAB
ALBUMIN SERPL ELPH-MCNC: 4.7 G/DL
ALP BLD-CCNC: 65 U/L
ALT SERPL-CCNC: 12 U/L
ANION GAP SERPL CALC-SCNC: 14 MMOL/L
AST SERPL-CCNC: 17 U/L
BASOPHILS # BLD AUTO: 0.04 K/UL
BASOPHILS NFR BLD AUTO: 0.6 %
BILIRUB SERPL-MCNC: 0.4 MG/DL
BUN SERPL-MCNC: 12 MG/DL
CALCIUM SERPL-MCNC: 9.4 MG/DL
CHLORIDE SERPL-SCNC: 104 MMOL/L
CHOLEST SERPL-MCNC: 226 MG/DL
CO2 SERPL-SCNC: 24 MMOL/L
CREAT SERPL-MCNC: 0.56 MG/DL
EGFR: 131 ML/MIN/1.73M2
EOSINOPHIL # BLD AUTO: 0.04 K/UL
EOSINOPHIL NFR BLD AUTO: 0.6 %
ESTIMATED AVERAGE GLUCOSE: 97 MG/DL
GLUCOSE SERPL-MCNC: 85 MG/DL
HBA1C MFR BLD HPLC: 5 %
HBV SURFACE AB SERPL IA-ACNC: 829.2 MIU/ML
HCT VFR BLD CALC: 40.5 %
HDLC SERPL-MCNC: 92 MG/DL
HGB BLD-MCNC: 13.8 G/DL
IMM GRANULOCYTES NFR BLD AUTO: 0.6 %
LDLC SERPL CALC-MCNC: 119 MG/DL
LYMPHOCYTES # BLD AUTO: 1.38 K/UL
LYMPHOCYTES NFR BLD AUTO: 21.3 %
M TB IFN-G BLD-IMP: NEGATIVE
MAN DIFF?: NORMAL
MCHC RBC-ENTMCNC: 30.8 PG
MCHC RBC-ENTMCNC: 34.1 GM/DL
MCV RBC AUTO: 90.4 FL
MEV IGG FLD QL IA: 52.6 AU/ML
MEV IGG+IGM SER-IMP: POSITIVE
MONOCYTES # BLD AUTO: 0.39 K/UL
MONOCYTES NFR BLD AUTO: 6 %
MUV AB SER-ACNC: POSITIVE
MUV IGG SER QL IA: 102 AU/ML
NEUTROPHILS # BLD AUTO: 4.58 K/UL
NEUTROPHILS NFR BLD AUTO: 70.9 %
NONHDLC SERPL-MCNC: 134 MG/DL
PLATELET # BLD AUTO: 293 K/UL
POTASSIUM SERPL-SCNC: 3.9 MMOL/L
PROT SERPL-MCNC: 7.5 G/DL
QUANTIFERON TB PLUS MITOGEN MINUS NIL: >10 IU/ML
QUANTIFERON TB PLUS NIL: 0.01 IU/ML
QUANTIFERON TB PLUS TB1 MINUS NIL: 0 IU/ML
QUANTIFERON TB PLUS TB2 MINUS NIL: 0 IU/ML
RBC # BLD: 4.48 M/UL
RBC # FLD: 12.7 %
RUBV IGG FLD-ACNC: 5.3 INDEX
RUBV IGG FLD-ACNC: 5.3 INDEX
RUBV IGG SER-IMP: POSITIVE
RUBV IGG SER-IMP: POSITIVE
RUBV IGM FLD-ACNC: <20 AU/ML
SODIUM SERPL-SCNC: 141 MMOL/L
TRIGL SERPL-MCNC: 88 MG/DL
TSH SERPL-ACNC: 1.45 UIU/ML
VZV AB TITR SER: NORMAL
VZV IGG SER IF-ACNC: 153.7 INDEX
WBC # FLD AUTO: 6.47 K/UL

## 2024-01-25 ENCOUNTER — APPOINTMENT (OUTPATIENT)
Dept: INTERNAL MEDICINE | Facility: CLINIC | Age: 24
End: 2024-01-25
Payer: COMMERCIAL

## 2024-01-25 LAB — HCG UR QL: NEGATIVE

## 2024-01-25 PROCEDURE — 90716 VAR VACCINE LIVE SUBQ: CPT

## 2024-01-25 PROCEDURE — 81025 URINE PREGNANCY TEST: CPT

## 2024-01-25 PROCEDURE — 90471 IMMUNIZATION ADMIN: CPT

## 2024-02-12 RX ORDER — HYOSCYAMINE SULFATE 0.12 MG/1
0.12 TABLET, ORALLY DISINTEGRATING ORAL
Qty: 90 | Refills: 3 | Status: ACTIVE | COMMUNITY
Start: 2023-07-12 | End: 1900-01-01

## 2024-05-23 ENCOUNTER — APPOINTMENT (OUTPATIENT)
Dept: DERMATOLOGY | Facility: CLINIC | Age: 24
End: 2024-05-23
Payer: COMMERCIAL

## 2024-05-23 DIAGNOSIS — D22.9 MELANOCYTIC NEVI, UNSPECIFIED: ICD-10-CM

## 2024-05-23 DIAGNOSIS — L74.510 PRIMARY FOCAL HYPERHIDROSIS, AXILLA: ICD-10-CM

## 2024-05-23 PROCEDURE — 99214 OFFICE O/P EST MOD 30 MIN: CPT

## 2024-05-23 RX ORDER — ONABOTULINUMTOXINA 200 [USP'U]/1
200 INJECTION, POWDER, LYOPHILIZED, FOR SOLUTION INTRADERMAL; INTRAMUSCULAR
Qty: 1 | Refills: 3 | Status: ACTIVE | COMMUNITY
Start: 2021-08-12 | End: 1900-01-01

## 2024-05-23 NOTE — PHYSICAL EXAM
[FreeTextEntry3] : Multiple benign nevi were noted.  large, compound [place] nevi clustered on upper back,  one papular nevus L flank

## 2024-05-23 NOTE — ASSESSMENT
[FreeTextEntry1] : Hyperhidrosis;     Therapeutic options and their risks and benefits; along with multiple diagnostic possibilities were discussed at length; risks and benefits of further study were discussed;  restart botox;  Rx sent to [facility]  nevi;  benign nevi, mild atypia;   Continue regular exams;

## 2024-05-23 NOTE — HISTORY OF PRESENT ILLNESS
[de-identified] : Pt. with long history of hyperhidrosis- has failed topical treatments such a dry sol in past; had botox in 2021, tolerated well, with good results wishes to restart  also:  moles on back

## 2024-06-05 ENCOUNTER — APPOINTMENT (OUTPATIENT)
Dept: ORTHOPEDIC SURGERY | Facility: CLINIC | Age: 24
End: 2024-06-05

## 2024-06-20 ENCOUNTER — APPOINTMENT (OUTPATIENT)
Dept: DERMATOLOGY | Facility: CLINIC | Age: 24
End: 2024-06-20

## 2024-07-16 ENCOUNTER — APPOINTMENT (OUTPATIENT)
Dept: OBGYN | Facility: CLINIC | Age: 24
End: 2024-07-16
Payer: COMMERCIAL

## 2024-07-16 ENCOUNTER — APPOINTMENT (OUTPATIENT)
Dept: OBGYN | Facility: CLINIC | Age: 24
End: 2024-07-16

## 2024-07-16 DIAGNOSIS — R10.2 PELVIC AND PERINEAL PAIN: ICD-10-CM

## 2024-07-16 DIAGNOSIS — Z30.432 ENCOUNTER FOR REMOVAL OF INTRAUTERINE CONTRACEPTIVE DEVICE: ICD-10-CM

## 2024-07-16 PROCEDURE — 58301 REMOVE INTRAUTERINE DEVICE: CPT | Mod: 59

## 2024-07-16 PROCEDURE — 99203 OFFICE O/P NEW LOW 30 MIN: CPT | Mod: 25

## 2024-07-18 ENCOUNTER — OUTPATIENT (OUTPATIENT)
Dept: OUTPATIENT SERVICES | Facility: HOSPITAL | Age: 24
LOS: 1 days | End: 2024-07-18

## 2024-07-18 ENCOUNTER — APPOINTMENT (OUTPATIENT)
Dept: ULTRASOUND IMAGING | Facility: CLINIC | Age: 24
End: 2024-07-18

## 2024-07-18 ENCOUNTER — EMERGENCY (EMERGENCY)
Facility: HOSPITAL | Age: 24
LOS: 0 days | Discharge: HOME | End: 2024-07-18
Attending: STUDENT IN AN ORGANIZED HEALTH CARE EDUCATION/TRAINING PROGRAM
Payer: COMMERCIAL

## 2024-07-18 VITALS
RESPIRATION RATE: 18 BRPM | HEART RATE: 98 BPM | SYSTOLIC BLOOD PRESSURE: 93 MMHG | TEMPERATURE: 99 F | DIASTOLIC BLOOD PRESSURE: 55 MMHG | OXYGEN SATURATION: 100 %

## 2024-07-18 VITALS
SYSTOLIC BLOOD PRESSURE: 108 MMHG | DIASTOLIC BLOOD PRESSURE: 67 MMHG | OXYGEN SATURATION: 100 % | RESPIRATION RATE: 19 BRPM | WEIGHT: 119.27 LBS | TEMPERATURE: 103 F | HEART RATE: 113 BPM

## 2024-07-18 DIAGNOSIS — R10.9 UNSPECIFIED ABDOMINAL PAIN: ICD-10-CM

## 2024-07-18 DIAGNOSIS — R00.0 TACHYCARDIA, UNSPECIFIED: ICD-10-CM

## 2024-07-18 DIAGNOSIS — R11.2 NAUSEA WITH VOMITING, UNSPECIFIED: ICD-10-CM

## 2024-07-18 DIAGNOSIS — R05.9 COUGH, UNSPECIFIED: ICD-10-CM

## 2024-07-18 DIAGNOSIS — Z86.59 PERSONAL HISTORY OF OTHER MENTAL AND BEHAVIORAL DISORDERS: ICD-10-CM

## 2024-07-18 DIAGNOSIS — N12 TUBULO-INTERSTITIAL NEPHRITIS, NOT SPECIFIED AS ACUTE OR CHRONIC: ICD-10-CM

## 2024-07-18 DIAGNOSIS — Z80.42 FAMILY HISTORY OF MALIGNANT NEOPLASM OF PROSTATE: ICD-10-CM

## 2024-07-18 DIAGNOSIS — F41.9 ANXIETY DISORDER, UNSPECIFIED: ICD-10-CM

## 2024-07-18 DIAGNOSIS — R10.2 PELVIC AND PERINEAL PAIN: ICD-10-CM

## 2024-07-18 DIAGNOSIS — Z20.822 CONTACT WITH AND (SUSPECTED) EXPOSURE TO COVID-19: ICD-10-CM

## 2024-07-18 LAB
ALBUMIN SERPL ELPH-MCNC: 3.9 G/DL — SIGNIFICANT CHANGE UP (ref 3.3–5)
ALP SERPL-CCNC: 89 U/L — SIGNIFICANT CHANGE UP (ref 40–120)
ALT FLD-CCNC: 35 U/L — SIGNIFICANT CHANGE UP (ref 12–78)
ANION GAP SERPL CALC-SCNC: 10 MMOL/L — SIGNIFICANT CHANGE UP (ref 5–17)
APPEARANCE UR: ABNORMAL
APTT BLD: 30.5 SEC — SIGNIFICANT CHANGE UP (ref 24.5–35.6)
AST SERPL-CCNC: 21 U/L — SIGNIFICANT CHANGE UP (ref 15–37)
B MICROTI DNA BLD QL NAA+PROBE: SIGNIFICANT CHANGE UP
BASOPHILS # BLD AUTO: 0.03 K/UL — SIGNIFICANT CHANGE UP (ref 0–0.2)
BASOPHILS NFR BLD AUTO: 0.2 % — SIGNIFICANT CHANGE UP (ref 0–2)
BILIRUB SERPL-MCNC: 0.5 MG/DL — SIGNIFICANT CHANGE UP (ref 0.2–1.2)
BILIRUB UR-MCNC: NEGATIVE — SIGNIFICANT CHANGE UP
BUN SERPL-MCNC: 13 MG/DL — SIGNIFICANT CHANGE UP (ref 7–23)
CALCIUM SERPL-MCNC: 9.1 MG/DL — SIGNIFICANT CHANGE UP (ref 8.5–10.1)
CHLORIDE SERPL-SCNC: 104 MMOL/L — SIGNIFICANT CHANGE UP (ref 96–108)
CO2 SERPL-SCNC: 23 MMOL/L — SIGNIFICANT CHANGE UP (ref 22–31)
COLOR SPEC: YELLOW — SIGNIFICANT CHANGE UP
CREAT SERPL-MCNC: 0.7 MG/DL — SIGNIFICANT CHANGE UP (ref 0.5–1.3)
DIFF PNL FLD: ABNORMAL
EGFR: 124 ML/MIN/1.73M2 — SIGNIFICANT CHANGE UP
EOSINOPHIL # BLD AUTO: 0 K/UL — SIGNIFICANT CHANGE UP (ref 0–0.5)
EOSINOPHIL NFR BLD AUTO: 0 % — SIGNIFICANT CHANGE UP (ref 0–6)
FLUAV AG NPH QL: SIGNIFICANT CHANGE UP
FLUBV AG NPH QL: SIGNIFICANT CHANGE UP
GLUCOSE SERPL-MCNC: 97 MG/DL — SIGNIFICANT CHANGE UP (ref 70–99)
GLUCOSE UR QL: NEGATIVE MG/DL — SIGNIFICANT CHANGE UP
HCG SERPL-ACNC: <1 MIU/ML — SIGNIFICANT CHANGE UP
HCT VFR BLD CALC: 34.7 % — SIGNIFICANT CHANGE UP (ref 34.5–45)
HGB BLD-MCNC: 11.6 G/DL — SIGNIFICANT CHANGE UP (ref 11.5–15.5)
IMM GRANULOCYTES NFR BLD AUTO: 0.5 % — SIGNIFICANT CHANGE UP (ref 0–0.9)
INR BLD: 1.02 RATIO — SIGNIFICANT CHANGE UP (ref 0.85–1.18)
KETONES UR-MCNC: NEGATIVE MG/DL — SIGNIFICANT CHANGE UP
LACTATE SERPL-SCNC: 1.4 MMOL/L — SIGNIFICANT CHANGE UP (ref 0.7–2)
LEUKOCYTE ESTERASE UR-ACNC: ABNORMAL
LYMPHOCYTES # BLD AUTO: 0.76 K/UL — LOW (ref 1–3.3)
LYMPHOCYTES # BLD AUTO: 5.8 % — LOW (ref 13–44)
MCHC RBC-ENTMCNC: 27.6 PG — SIGNIFICANT CHANGE UP (ref 27–34)
MCHC RBC-ENTMCNC: 33.4 GM/DL — SIGNIFICANT CHANGE UP (ref 32–36)
MCV RBC AUTO: 82.6 FL — SIGNIFICANT CHANGE UP (ref 80–100)
MONOCYTES # BLD AUTO: 0.82 K/UL — SIGNIFICANT CHANGE UP (ref 0–0.9)
MONOCYTES NFR BLD AUTO: 6.3 % — SIGNIFICANT CHANGE UP (ref 2–14)
NEUTROPHILS # BLD AUTO: 11.37 K/UL — HIGH (ref 1.8–7.4)
NEUTROPHILS NFR BLD AUTO: 87.2 % — HIGH (ref 43–77)
NITRITE UR-MCNC: POSITIVE
PH UR: 6 — SIGNIFICANT CHANGE UP (ref 5–8)
PLATELET # BLD AUTO: 256 K/UL — SIGNIFICANT CHANGE UP (ref 150–400)
POCT URINE PREGNANCY TEST: NEGATIVE — SIGNIFICANT CHANGE UP
POTASSIUM SERPL-MCNC: 3.5 MMOL/L — SIGNIFICANT CHANGE UP (ref 3.5–5.3)
POTASSIUM SERPL-SCNC: 3.5 MMOL/L — SIGNIFICANT CHANGE UP (ref 3.5–5.3)
PROT SERPL-MCNC: 8.1 GM/DL — SIGNIFICANT CHANGE UP (ref 6–8.3)
PROT UR-MCNC: SIGNIFICANT CHANGE UP MG/DL
PROTHROM AB SERPL-ACNC: 11.5 SEC — SIGNIFICANT CHANGE UP (ref 9.5–13)
RBC # BLD: 4.2 M/UL — SIGNIFICANT CHANGE UP (ref 3.8–5.2)
RBC # FLD: 13.4 % — SIGNIFICANT CHANGE UP (ref 10.3–14.5)
RSV RNA NPH QL NAA+NON-PROBE: SIGNIFICANT CHANGE UP
SARS-COV-2 RNA SPEC QL NAA+PROBE: SIGNIFICANT CHANGE UP
SODIUM SERPL-SCNC: 137 MMOL/L — SIGNIFICANT CHANGE UP (ref 135–145)
SP GR SPEC: 1.01 — SIGNIFICANT CHANGE UP (ref 1–1.03)
UROBILINOGEN FLD QL: 0.2 MG/DL — SIGNIFICANT CHANGE UP (ref 0.2–1)
WBC # BLD: 13.05 K/UL — HIGH (ref 3.8–10.5)
WBC # FLD AUTO: 13.05 K/UL — HIGH (ref 3.8–10.5)

## 2024-07-18 PROCEDURE — 87186 SC STD MICRODIL/AGAR DIL: CPT

## 2024-07-18 PROCEDURE — 99285 EMERGENCY DEPT VISIT HI MDM: CPT | Mod: 25

## 2024-07-18 PROCEDURE — 96374 THER/PROPH/DIAG INJ IV PUSH: CPT

## 2024-07-18 PROCEDURE — 85610 PROTHROMBIN TIME: CPT

## 2024-07-18 PROCEDURE — 0241U: CPT

## 2024-07-18 PROCEDURE — 76770 US EXAM ABDO BACK WALL COMP: CPT | Mod: 26

## 2024-07-18 PROCEDURE — 80053 COMPREHEN METABOLIC PANEL: CPT

## 2024-07-18 PROCEDURE — 87661 TRICHOMONAS VAGINALIS AMPLIF: CPT

## 2024-07-18 PROCEDURE — 76830 TRANSVAGINAL US NON-OB: CPT

## 2024-07-18 PROCEDURE — 76830 TRANSVAGINAL US NON-OB: CPT | Mod: 26

## 2024-07-18 PROCEDURE — 87591 N.GONORRHOEAE DNA AMP PROB: CPT

## 2024-07-18 PROCEDURE — 87086 URINE CULTURE/COLONY COUNT: CPT

## 2024-07-18 PROCEDURE — 93010 ELECTROCARDIOGRAM REPORT: CPT

## 2024-07-18 PROCEDURE — 85730 THROMBOPLASTIN TIME PARTIAL: CPT

## 2024-07-18 PROCEDURE — 87484 EHRLICHA CHAFFEENSIS AMP PRB: CPT

## 2024-07-18 PROCEDURE — 81025 URINE PREGNANCY TEST: CPT

## 2024-07-18 PROCEDURE — 93005 ELECTROCARDIOGRAM TRACING: CPT

## 2024-07-18 PROCEDURE — 99285 EMERGENCY DEPT VISIT HI MDM: CPT

## 2024-07-18 PROCEDURE — 87468 ANAPLSMA PHGCYTOPHLM AMP PRB: CPT

## 2024-07-18 PROCEDURE — 87040 BLOOD CULTURE FOR BACTERIA: CPT

## 2024-07-18 PROCEDURE — 36415 COLL VENOUS BLD VENIPUNCTURE: CPT

## 2024-07-18 PROCEDURE — 86780 TREPONEMA PALLIDUM: CPT

## 2024-07-18 PROCEDURE — 71045 X-RAY EXAM CHEST 1 VIEW: CPT | Mod: 26

## 2024-07-18 PROCEDURE — 87476 LYME DIS DNA AMP PROBE: CPT

## 2024-07-18 PROCEDURE — 85025 COMPLETE CBC W/AUTO DIFF WBC: CPT

## 2024-07-18 PROCEDURE — 84702 CHORIONIC GONADOTROPIN TEST: CPT

## 2024-07-18 PROCEDURE — 87491 CHLMYD TRACH DNA AMP PROBE: CPT

## 2024-07-18 PROCEDURE — 96375 TX/PRO/DX INJ NEW DRUG ADDON: CPT

## 2024-07-18 PROCEDURE — 83605 ASSAY OF LACTIC ACID: CPT

## 2024-07-18 PROCEDURE — 87798 DETECT AGENT NOS DNA AMP: CPT

## 2024-07-18 PROCEDURE — 76770 US EXAM ABDO BACK WALL COMP: CPT

## 2024-07-18 PROCEDURE — 81001 URINALYSIS AUTO W/SCOPE: CPT

## 2024-07-18 PROCEDURE — 71045 X-RAY EXAM CHEST 1 VIEW: CPT

## 2024-07-18 RX ORDER — ACETAMINOPHEN 325 MG
1000 TABLET ORAL ONCE
Refills: 0 | Status: COMPLETED | OUTPATIENT
Start: 2024-07-18 | End: 2024-07-18

## 2024-07-18 RX ORDER — CEFTRIAXONE SODIUM 500 MG
1000 VIAL (EA) INJECTION ONCE
Refills: 0 | Status: COMPLETED | OUTPATIENT
Start: 2024-07-18 | End: 2024-07-18

## 2024-07-18 RX ORDER — CEFPODOXIME PROXETIL 50 MG/5 ML
1 SUSPENSION, RECONSTITUTED, ORAL (ML) ORAL
Qty: 20 | Refills: 0
Start: 2024-07-18 | End: 2024-07-27

## 2024-07-18 RX ORDER — KETOROLAC TROMETHAMINE 30 MG/ML
30 INJECTION, SOLUTION INTRAMUSCULAR ONCE
Refills: 0 | Status: DISCONTINUED | OUTPATIENT
Start: 2024-07-18 | End: 2024-07-18

## 2024-07-18 RX ORDER — ONDANSETRON HYDROCHLORIDE 2 MG/ML
1 INJECTION INTRAMUSCULAR; INTRAVENOUS
Qty: 10 | Refills: 0
Start: 2024-07-18 | End: 2024-07-22

## 2024-07-18 RX ORDER — ONDANSETRON HYDROCHLORIDE 2 MG/ML
4 INJECTION INTRAMUSCULAR; INTRAVENOUS ONCE
Refills: 0 | Status: COMPLETED | OUTPATIENT
Start: 2024-07-18 | End: 2024-07-18

## 2024-07-18 RX ORDER — SODIUM CHLORIDE 0.9 % (FLUSH) 0.9 %
1700 SYRINGE (ML) INJECTION ONCE
Refills: 0 | Status: COMPLETED | OUTPATIENT
Start: 2024-07-18 | End: 2024-07-18

## 2024-07-18 RX ORDER — CEFTRIAXONE SODIUM 500 MG
1000 VIAL (EA) INJECTION ONCE
Refills: 0 | Status: DISCONTINUED | OUTPATIENT
Start: 2024-07-18 | End: 2024-07-18

## 2024-07-18 RX ADMIN — Medication 400 MILLIGRAM(S): at 10:35

## 2024-07-18 RX ADMIN — Medication 1700 MILLILITER(S): at 12:07

## 2024-07-18 RX ADMIN — ONDANSETRON HYDROCHLORIDE 4 MILLIGRAM(S): 2 INJECTION INTRAMUSCULAR; INTRAVENOUS at 10:35

## 2024-07-18 RX ADMIN — Medication 1000 MILLIGRAM(S): at 12:07

## 2024-07-18 RX ADMIN — KETOROLAC TROMETHAMINE 30 MILLIGRAM(S): 30 INJECTION, SOLUTION INTRAMUSCULAR at 12:56

## 2024-07-19 LAB
C TRACH RRNA SPEC QL NAA+PROBE: SIGNIFICANT CHANGE UP
N GONORRHOEA RRNA SPEC QL NAA+PROBE: SIGNIFICANT CHANGE UP
SPECIMEN SOURCE: SIGNIFICANT CHANGE UP
SPECIMEN SOURCE: SIGNIFICANT CHANGE UP
T PALLIDUM AB TITR SER: NEGATIVE — SIGNIFICANT CHANGE UP
T VAGINALIS RRNA SPEC QL NAA+PROBE: SIGNIFICANT CHANGE UP

## 2024-07-21 LAB
A PHAGOCYTOPH DNA BLD QL NAA+PROBE: NEGATIVE — SIGNIFICANT CHANGE UP
E CHAFFEENSIS DNA BLD QL NAA+PROBE: NEGATIVE — SIGNIFICANT CHANGE UP
E EWINGII DNA SPEC QL NAA+PROBE: NEGATIVE — SIGNIFICANT CHANGE UP
EHRLICHIA DNA SPEC QL NAA+PROBE: NEGATIVE — SIGNIFICANT CHANGE UP

## 2024-07-23 LAB
CULTURE RESULTS: SIGNIFICANT CHANGE UP
CULTURE RESULTS: SIGNIFICANT CHANGE UP
SPECIMEN SOURCE: SIGNIFICANT CHANGE UP
SPECIMEN SOURCE: SIGNIFICANT CHANGE UP

## 2024-07-29 ENCOUNTER — APPOINTMENT (OUTPATIENT)
Dept: INTERNAL MEDICINE | Facility: CLINIC | Age: 24
End: 2024-07-29

## 2024-07-29 VITALS — WEIGHT: 120 LBS | HEIGHT: 62 IN | BODY MASS INDEX: 22.08 KG/M2

## 2024-07-29 DIAGNOSIS — Z33.2 ENCOUNTER FOR ELECTIVE TERMINATION OF PREGNANCY: ICD-10-CM

## 2024-07-30 ENCOUNTER — APPOINTMENT (OUTPATIENT)
Dept: OBGYN | Facility: CLINIC | Age: 24
End: 2024-07-30
Payer: COMMERCIAL

## 2024-07-30 VITALS
TEMPERATURE: 98.5 F | SYSTOLIC BLOOD PRESSURE: 102 MMHG | DIASTOLIC BLOOD PRESSURE: 65 MMHG | HEART RATE: 64 BPM | OXYGEN SATURATION: 96 %

## 2024-07-30 DIAGNOSIS — Z30.017 ENCOUNTER FOR INITIAL PRESCRIPTION OF IMPLANTABLE SUBDERMAL CONTRACEPTIVE: ICD-10-CM

## 2024-07-30 LAB — HCG UR QL: NEGATIVE

## 2024-07-30 PROCEDURE — 81025 URINE PREGNANCY TEST: CPT

## 2024-07-30 PROCEDURE — 11981 INSERTION DRUG DLVR IMPLANT: CPT

## 2024-07-30 RX ORDER — ETONOGESTREL 68 MG/1
68 IMPLANT SUBCUTANEOUS
Qty: 0 | Refills: 0 | Status: COMPLETED | OUTPATIENT
Start: 2024-07-30

## 2024-07-30 RX ADMIN — ETONOGESTREL 0 MG: 68 IMPLANT SUBCUTANEOUS at 00:00

## 2024-07-30 NOTE — HISTORY OF PRESENT ILLNESS
[FreeTextEntry1] : Referred by Dr. Weber 24 yo  presenting for nexplanon insertion. Reports UPI 2 days ago. Declines emergency contraception. Aware UCG from today is not reflective of last UPI.  All: PCN Meds: denies Obhx: conceived on ocps-  Gynhx: has tried nuvaring, ocps, copper iud, unhappy with all of these methods PMH/PSH: as above  Nexplanon insertion HcG: negative  The patient was counseled on the risks, benefits and alternatives of the subdermal implant.  The patient was counseled that the implant goes under the skin of the arm, it is a thin, matchstick-sized bill made of plastic that releases the hormone progestin.  This hormone like the hormone made by the body.  It prevents pregnancy by preventing ovulation and thickening cervical mucous, this prevents sperm from getting to eggs.  It is effective for 5 years.  The benefits of the implant are: There nothing the patient has to do prior to sex to make the implant work.  Return to fertility after implant removal is immediate.  It helps with painful menses.  The risks of the implant are: discoloring or a scar on the arm where the implant goes in.  Rarely, arm pain for longer than a few days.  Rarely, an infection or pain in the arm that needs medicine.  Very rarely, an implant may move from the place where it was put in. The side effects of the implant are: nausea (which usually clears up in 2-3 months), sore breasts (usually clears up in 2-3 months), headache, irregular bleeding (including early or late periods, spotting in between menses, or no periods), weight gain, soreness, bruising, or swelling for a few days after the implant is put in.   The implant may affect other medications the patient is taking, and the patient was instructed to tell their physicians if medications are changed.  No promise can be made about the outcome of putting in the implant.   The patient voiced understanding of the risks of irregular bleeding and the need to use condoms for protection from STIs.  They were counseled on the need to use back up contraception x 1 week.  The patient is Left handed.  Pre-operative time out performed.  Patients name, date of birth and procedure confirmed.  The patients non-dominant arm was flexed at the elbow and externally rotated so that their hand was underneath their head. The insertion site was identified as overlying the triceps muscle about 8-10 cm from the medial epicondyle of the humerus and 3-5 cm posterior to the sulcus between the biceps and triceps muscles, inserted as far posterior from the sulcus as possible The arm was prepped with betadyne. 3 cc of 1% lidocaine was injected.   The skin was punctured with the tip of the needle slightly angled at less 30 degrees, and the needle was inserted until the bevel was just under the skin.  The applicator was lowered to a nearly  horizontal position and the skin was lifted with the needle while sliding the needle to its full length and tenting the skin upwards. The slider was moved back until it stopped and the implant inserted. The implant was palpated by the provider and the patient.  Excellent hemostasis noted.  Steri-strip was applied with instructions to keep on x 72 hours.  Pressure dressing was applied using sterile gauze, with instructions to keep on x 24 hours.  The patient tolerated the procedure well. EBL: minimal  Nexplanon device provided by my office. Patient given a User Card with instructions to follow up as needed and to have device removed in 5 years.   Lot#: [id number] Expiration for insertion: 2026 NDC: 30288-825-99

## 2024-08-14 ENCOUNTER — APPOINTMENT (OUTPATIENT)
Dept: NEUROSURGERY | Facility: CLINIC | Age: 24
End: 2024-08-14

## 2024-09-12 ENCOUNTER — NON-APPOINTMENT (OUTPATIENT)
Age: 24
End: 2024-09-12

## 2024-09-30 ENCOUNTER — APPOINTMENT (OUTPATIENT)
Dept: NEUROLOGY | Facility: CLINIC | Age: 24
End: 2024-09-30
Payer: COMMERCIAL

## 2024-09-30 VITALS
BODY MASS INDEX: 21.16 KG/M2 | TEMPERATURE: 98.2 F | SYSTOLIC BLOOD PRESSURE: 105 MMHG | DIASTOLIC BLOOD PRESSURE: 69 MMHG | HEIGHT: 62 IN | HEART RATE: 77 BPM | WEIGHT: 115 LBS

## 2024-09-30 PROCEDURE — 99203 OFFICE O/P NEW LOW 30 MIN: CPT

## 2024-09-30 RX ORDER — DEXTROAMPHETAMINE SACCHARATE, AMPHETAMINE ASPARTATE MONOHYDRATE, DEXTROAMPHETAMINE SULFATE AND AMPHETAMINE SULFATE 2.5; 2.5; 2.5; 2.5 MG/1; MG/1; MG/1; MG/1
10 CAPSULE, EXTENDED RELEASE ORAL
Qty: 15 | Refills: 0 | Status: ACTIVE | COMMUNITY
Start: 2024-09-30 | End: 1900-01-01

## 2024-09-30 NOTE — REVIEW OF SYSTEMS
[As Noted in HPI] : as noted in HPI [Decr. Concentrating Ability] : decreased concentrating ability [Tension Headache] : tension-type headaches [Sleep Disturbances] : sleep disturbances [Negative] : Heme/Lymph

## 2024-09-30 NOTE — PHYSICAL EXAM
[General Appearance - Alert] : alert [General Appearance - In No Acute Distress] : in no acute distress [General Appearance - Well Nourished] : well nourished [General Appearance - Well Developed] : well developed [General Appearance - Well-Appearing] : healthy appearing [] : normal voice and communication [Oriented To Time, Place, And Person] : oriented to person, place, and time [Impaired Insight] : insight and judgment were intact [Affect] : the affect was normal [Mood] : the mood was normal [Memory Recent] : recent memory was not impaired [Memory Remote] : remote memory was not impaired [Person] : oriented to person [Place] : oriented to place [Time] : oriented to time [Fluency] : fluency intact [Comprehension] : comprehension intact [Past History] : adequate knowledge of personal past history

## 2024-09-30 NOTE — DISCUSSION/SUMMARY
[FreeTextEntry1] : 24-year-old woman right-handed with a history of attention deficit hyperactivity disorder.  Would like to start treatment again. Reviewed and discussed options, reportedly had good response as per mother to Adderall.  Discussed options. Plan: Start with a 2-week supply of Adderall, extended release 10 mg in the morning. Advised patient to give me a call back, in 2 weeks and let me know how she is doing with the medication.  Any changes in dosing, preparation will be made at a later time.   Discussed possible side effects, interactions. Return to office, 6 months.

## 2024-09-30 NOTE — HISTORY OF PRESENT ILLNESS
[FreeTextEntry1] : 44-year-old right-handed woman accompanied by her mother, history and diagnosed with attention deficit hyperactivity disorder when she was in her early teenage years, treated initially with Adderall, although not for a long time.  Patient was hesitant, resistant to treatment.  Was able to finish high school, but now in college, working in a hospital setting, and finding difficulty, keeping herself on focus, avoid distractions, find herself starting different projects, not finishing much, with feeling behind, stressed out.  Is interested in trying medication again.

## 2024-10-01 ENCOUNTER — NON-APPOINTMENT (OUTPATIENT)
Age: 24
End: 2024-10-01

## 2025-01-20 ENCOUNTER — APPOINTMENT (OUTPATIENT)
Dept: FAMILY MEDICINE | Facility: CLINIC | Age: 25
End: 2025-01-20
Payer: COMMERCIAL

## 2025-01-20 VITALS
HEIGHT: 62 IN | OXYGEN SATURATION: 97 % | HEART RATE: 82 BPM | WEIGHT: 121 LBS | TEMPERATURE: 98.3 F | BODY MASS INDEX: 22.26 KG/M2 | SYSTOLIC BLOOD PRESSURE: 104 MMHG | DIASTOLIC BLOOD PRESSURE: 82 MMHG

## 2025-01-20 DIAGNOSIS — Z00.00 ENCOUNTER FOR GENERAL ADULT MEDICAL EXAMINATION W/OUT ABNORMAL FINDINGS: ICD-10-CM

## 2025-01-20 DIAGNOSIS — Z02.0 ENCOUNTER FOR EXAMINATION FOR ADMISSION TO EDUCATIONAL INSTITUTION: ICD-10-CM

## 2025-01-20 LAB
HBV SURFACE AB SER QL: REACTIVE
MEV IGG FLD QL IA: 33 AU/ML
MEV IGG+IGM SER-IMP: POSITIVE
MUV AB SER-ACNC: POSITIVE
MUV IGG SER QL IA: 84.9 AU/ML
RUBV IGG FLD-ACNC: 3.88 INDEX
RUBV IGG SER-IMP: POSITIVE
VZV AB TITR SER: POSITIVE
VZV IGG SER IF-ACNC: 2.08 S/CO

## 2025-01-20 PROCEDURE — 99385 PREV VISIT NEW AGE 18-39: CPT

## 2025-01-23 LAB
M TB IFN-G BLD-IMP: NEGATIVE
QUANTIFERON TB PLUS MITOGEN MINUS NIL: >10 IU/ML
QUANTIFERON TB PLUS NIL: 0.03 IU/ML
QUANTIFERON TB PLUS TB1 MINUS NIL: 0 IU/ML
QUANTIFERON TB PLUS TB2 MINUS NIL: 0.03 IU/ML

## 2025-02-27 DIAGNOSIS — Z09 ENCOUNTER FOR FOLLOW-UP EXAMINATION AFTER COMPLETED TREATMENT FOR CONDITIONS OTHER THAN MALIGNANT NEOPLASM: ICD-10-CM

## 2025-03-05 ENCOUNTER — APPOINTMENT (OUTPATIENT)
Dept: OBGYN | Facility: CLINIC | Age: 25
End: 2025-03-05

## 2025-04-12 ENCOUNTER — LABORATORY RESULT (OUTPATIENT)
Age: 25
End: 2025-04-12

## 2025-04-14 ENCOUNTER — NON-APPOINTMENT (OUTPATIENT)
Age: 25
End: 2025-04-14

## 2025-04-14 DIAGNOSIS — R79.89 OTHER SPECIFIED ABNORMAL FINDINGS OF BLOOD CHEMISTRY: ICD-10-CM

## 2025-05-29 ENCOUNTER — NON-APPOINTMENT (OUTPATIENT)
Age: 25
End: 2025-05-29

## 2025-07-15 ENCOUNTER — TRANSCRIPTION ENCOUNTER (OUTPATIENT)
Age: 25
End: 2025-07-15

## 2025-07-23 ENCOUNTER — TRANSCRIPTION ENCOUNTER (OUTPATIENT)
Age: 25
End: 2025-07-23

## 2025-08-01 ENCOUNTER — APPOINTMENT (OUTPATIENT)
Dept: FAMILY MEDICINE | Facility: CLINIC | Age: 25
End: 2025-08-01

## 2025-09-09 ENCOUNTER — EMERGENCY (EMERGENCY)
Facility: HOSPITAL | Age: 25
LOS: 0 days | Discharge: HOME | End: 2025-09-09
Attending: STUDENT IN AN ORGANIZED HEALTH CARE EDUCATION/TRAINING PROGRAM
Payer: COMMERCIAL

## 2025-09-09 VITALS
TEMPERATURE: 98 F | WEIGHT: 130.29 LBS | SYSTOLIC BLOOD PRESSURE: 108 MMHG | OXYGEN SATURATION: 98 % | HEART RATE: 92 BPM | RESPIRATION RATE: 17 BRPM | DIASTOLIC BLOOD PRESSURE: 61 MMHG

## 2025-09-09 VITALS
OXYGEN SATURATION: 98 % | SYSTOLIC BLOOD PRESSURE: 101 MMHG | DIASTOLIC BLOOD PRESSURE: 66 MMHG | HEART RATE: 82 BPM | TEMPERATURE: 98 F | RESPIRATION RATE: 17 BRPM

## 2025-09-09 DIAGNOSIS — Z88.0 ALLERGY STATUS TO PENICILLIN: ICD-10-CM

## 2025-09-09 DIAGNOSIS — M25.551 PAIN IN RIGHT HIP: ICD-10-CM

## 2025-09-09 DIAGNOSIS — N12 TUBULO-INTERSTITIAL NEPHRITIS, NOT SPECIFIED AS ACUTE OR CHRONIC: ICD-10-CM

## 2025-09-09 LAB
ALBUMIN SERPL ELPH-MCNC: 3.6 G/DL — SIGNIFICANT CHANGE UP (ref 3.3–5)
ALP SERPL-CCNC: 91 U/L — SIGNIFICANT CHANGE UP (ref 40–120)
ALT FLD-CCNC: 28 U/L — SIGNIFICANT CHANGE UP (ref 12–78)
ANION GAP SERPL CALC-SCNC: 4 MMOL/L — LOW (ref 5–17)
APPEARANCE UR: ABNORMAL
AST SERPL-CCNC: 18 U/L — SIGNIFICANT CHANGE UP (ref 15–37)
BACTERIA # UR AUTO: ABNORMAL /HPF
BASOPHILS # BLD AUTO: 0.04 K/UL — SIGNIFICANT CHANGE UP (ref 0–0.2)
BASOPHILS NFR BLD AUTO: 0.5 % — SIGNIFICANT CHANGE UP (ref 0–2)
BILIRUB SERPL-MCNC: 0.3 MG/DL — SIGNIFICANT CHANGE UP (ref 0.2–1.2)
BILIRUB UR-MCNC: NEGATIVE — SIGNIFICANT CHANGE UP
BUN SERPL-MCNC: 16 MG/DL — SIGNIFICANT CHANGE UP (ref 7–23)
CALCIUM SERPL-MCNC: 8.7 MG/DL — SIGNIFICANT CHANGE UP (ref 8.5–10.1)
CAST: 0 /LPF — SIGNIFICANT CHANGE UP (ref 0–4)
CHLORIDE SERPL-SCNC: 110 MMOL/L — HIGH (ref 96–108)
CO2 SERPL-SCNC: 24 MMOL/L — SIGNIFICANT CHANGE UP (ref 22–31)
COLOR SPEC: YELLOW — SIGNIFICANT CHANGE UP
CREAT SERPL-MCNC: 0.62 MG/DL — SIGNIFICANT CHANGE UP (ref 0.5–1.3)
CRP SERPL-MCNC: 5.8 MG/L — HIGH (ref 0–5)
DIFF PNL FLD: NEGATIVE — SIGNIFICANT CHANGE UP
EGFR: 126 ML/MIN/1.73M2 — SIGNIFICANT CHANGE UP
EGFR: 126 ML/MIN/1.73M2 — SIGNIFICANT CHANGE UP
EOSINOPHIL # BLD AUTO: 0.1 K/UL — SIGNIFICANT CHANGE UP (ref 0–0.5)
EOSINOPHIL NFR BLD AUTO: 1.2 % — SIGNIFICANT CHANGE UP (ref 0–6)
ERYTHROCYTE [SEDIMENTATION RATE] IN BLOOD: 8 MM/HR — SIGNIFICANT CHANGE UP (ref 0–20)
GLUCOSE SERPL-MCNC: 83 MG/DL — SIGNIFICANT CHANGE UP (ref 70–99)
GLUCOSE UR QL: NEGATIVE MG/DL — SIGNIFICANT CHANGE UP
HCT VFR BLD CALC: 39.3 % — SIGNIFICANT CHANGE UP (ref 34.5–45)
HGB BLD-MCNC: 13 G/DL — SIGNIFICANT CHANGE UP (ref 11.5–15.5)
IMM GRANULOCYTES # BLD AUTO: 0.04 K/UL — SIGNIFICANT CHANGE UP (ref 0–0.07)
IMM GRANULOCYTES NFR BLD AUTO: 0.5 % — SIGNIFICANT CHANGE UP (ref 0–0.9)
KETONES UR QL: NEGATIVE MG/DL — SIGNIFICANT CHANGE UP
LACTATE SERPL-SCNC: 0.9 MMOL/L — SIGNIFICANT CHANGE UP (ref 0.7–2)
LEUKOCYTE ESTERASE UR-ACNC: ABNORMAL
LYMPHOCYTES # BLD AUTO: 2.4 K/UL — SIGNIFICANT CHANGE UP (ref 1–3.3)
LYMPHOCYTES NFR BLD AUTO: 29.5 % — SIGNIFICANT CHANGE UP (ref 13–44)
MCHC RBC-ENTMCNC: 29.2 PG — SIGNIFICANT CHANGE UP (ref 27–34)
MCHC RBC-ENTMCNC: 33.1 G/DL — SIGNIFICANT CHANGE UP (ref 32–36)
MCV RBC AUTO: 88.3 FL — SIGNIFICANT CHANGE UP (ref 80–100)
MONOCYTES # BLD AUTO: 0.63 K/UL — SIGNIFICANT CHANGE UP (ref 0–0.9)
MONOCYTES NFR BLD AUTO: 7.7 % — SIGNIFICANT CHANGE UP (ref 2–14)
NEUTROPHILS # BLD AUTO: 4.93 K/UL — SIGNIFICANT CHANGE UP (ref 1.8–7.4)
NEUTROPHILS NFR BLD AUTO: 60.6 % — SIGNIFICANT CHANGE UP (ref 43–77)
NITRITE UR-MCNC: NEGATIVE — SIGNIFICANT CHANGE UP
NRBC # BLD AUTO: 0 K/UL — SIGNIFICANT CHANGE UP (ref 0–0)
NRBC # FLD: 0 K/UL — SIGNIFICANT CHANGE UP (ref 0–0)
NRBC BLD AUTO-RTO: 0 /100 WBCS — SIGNIFICANT CHANGE UP (ref 0–0)
PH UR: 8 — SIGNIFICANT CHANGE UP (ref 5–8)
PLATELET # BLD AUTO: 261 K/UL — SIGNIFICANT CHANGE UP (ref 150–400)
PMV BLD: 9.8 FL — SIGNIFICANT CHANGE UP (ref 7–13)
POCT URINE PREGNANCY TEST: NEGATIVE — SIGNIFICANT CHANGE UP
POTASSIUM SERPL-MCNC: 3.7 MMOL/L — SIGNIFICANT CHANGE UP (ref 3.5–5.3)
POTASSIUM SERPL-SCNC: 3.7 MMOL/L — SIGNIFICANT CHANGE UP (ref 3.5–5.3)
PROT SERPL-MCNC: 7 GM/DL — SIGNIFICANT CHANGE UP (ref 6–8.3)
PROT UR-MCNC: NEGATIVE MG/DL — SIGNIFICANT CHANGE UP
RBC # BLD: 4.45 M/UL — SIGNIFICANT CHANGE UP (ref 3.8–5.2)
RBC # FLD: 12.1 % — SIGNIFICANT CHANGE UP (ref 10.3–14.5)
RBC CASTS # UR COMP ASSIST: 2 /HPF — SIGNIFICANT CHANGE UP (ref 0–4)
SODIUM SERPL-SCNC: 138 MMOL/L — SIGNIFICANT CHANGE UP (ref 135–145)
SP GR SPEC: 1.02 — SIGNIFICANT CHANGE UP (ref 1–1.03)
SQUAMOUS # UR AUTO: 14 /HPF — HIGH (ref 0–5)
UROBILINOGEN FLD QL: 1 MG/DL — SIGNIFICANT CHANGE UP (ref 0.2–1)
WBC # BLD: 8.14 K/UL — SIGNIFICANT CHANGE UP (ref 3.8–10.5)
WBC # FLD AUTO: 8.14 K/UL — SIGNIFICANT CHANGE UP (ref 3.8–10.5)
WBC UR QL: 11 /HPF — HIGH (ref 0–5)

## 2025-09-09 PROCEDURE — 87086 URINE CULTURE/COLONY COUNT: CPT

## 2025-09-09 PROCEDURE — 86666 EHRLICHIA ANTIBODY: CPT

## 2025-09-09 PROCEDURE — 96375 TX/PRO/DX INJ NEW DRUG ADDON: CPT

## 2025-09-09 PROCEDURE — 73502 X-RAY EXAM HIP UNI 2-3 VIEWS: CPT | Mod: RT

## 2025-09-09 PROCEDURE — 73502 X-RAY EXAM HIP UNI 2-3 VIEWS: CPT | Mod: 26,RT

## 2025-09-09 PROCEDURE — 80053 COMPREHEN METABOLIC PANEL: CPT

## 2025-09-09 PROCEDURE — 86753 PROTOZOA ANTIBODY NOS: CPT

## 2025-09-09 PROCEDURE — 87476 LYME DIS DNA AMP PROBE: CPT

## 2025-09-09 PROCEDURE — 81001 URINALYSIS AUTO W/SCOPE: CPT

## 2025-09-09 PROCEDURE — 85652 RBC SED RATE AUTOMATED: CPT

## 2025-09-09 PROCEDURE — 36415 COLL VENOUS BLD VENIPUNCTURE: CPT

## 2025-09-09 PROCEDURE — 85025 COMPLETE CBC W/AUTO DIFF WBC: CPT

## 2025-09-09 PROCEDURE — 86140 C-REACTIVE PROTEIN: CPT

## 2025-09-09 PROCEDURE — 83605 ASSAY OF LACTIC ACID: CPT

## 2025-09-09 PROCEDURE — 86618 LYME DISEASE ANTIBODY: CPT

## 2025-09-09 PROCEDURE — 81025 URINE PREGNANCY TEST: CPT

## 2025-09-09 PROCEDURE — 96374 THER/PROPH/DIAG INJ IV PUSH: CPT

## 2025-09-09 PROCEDURE — 99285 EMERGENCY DEPT VISIT HI MDM: CPT

## 2025-09-09 PROCEDURE — 99284 EMERGENCY DEPT VISIT MOD MDM: CPT | Mod: 25

## 2025-09-09 RX ORDER — ACETAMINOPHEN 500 MG/5ML
1000 LIQUID (ML) ORAL ONCE
Refills: 0 | Status: COMPLETED | OUTPATIENT
Start: 2025-09-09 | End: 2025-09-09

## 2025-09-09 RX ORDER — CEFTRIAXONE 500 MG/1
1000 INJECTION, POWDER, FOR SOLUTION INTRAMUSCULAR; INTRAVENOUS ONCE
Refills: 0 | Status: COMPLETED | OUTPATIENT
Start: 2025-09-09 | End: 2025-09-09

## 2025-09-09 RX ORDER — ONDANSETRON HCL/PF 4 MG/2 ML
4 VIAL (ML) INJECTION ONCE
Refills: 0 | Status: COMPLETED | OUTPATIENT
Start: 2025-09-09 | End: 2025-09-09

## 2025-09-09 RX ORDER — ONDANSETRON HCL/PF 4 MG/2 ML
1 VIAL (ML) INJECTION
Qty: 20 | Refills: 0
Start: 2025-09-09

## 2025-09-09 RX ORDER — CEFPODOXIME PROXETIL 200 MG/1
1 TABLET, FILM COATED ORAL
Qty: 20 | Refills: 0
Start: 2025-09-09 | End: 2025-09-18

## 2025-09-09 RX ADMIN — Medication 4 MILLIGRAM(S): at 21:35

## 2025-09-09 RX ADMIN — CEFTRIAXONE 1000 MILLIGRAM(S): 500 INJECTION, POWDER, FOR SOLUTION INTRAMUSCULAR; INTRAVENOUS at 22:28

## 2025-09-09 RX ADMIN — Medication 400 MILLIGRAM(S): at 21:42

## 2025-09-10 LAB
B BURGDOR C6 AB SER-ACNC: NEGATIVE — SIGNIFICANT CHANGE UP
B BURGDOR IGG+IGM SER-ACNC: 0.32 INDEX — SIGNIFICANT CHANGE UP (ref 0.01–0.9)

## 2025-09-11 LAB
CULTURE RESULTS: SIGNIFICANT CHANGE UP
SPECIMEN SOURCE: SIGNIFICANT CHANGE UP

## 2025-09-12 LAB
B BURGDOR DNA SPEC QL NAA+PROBE: NEGATIVE — SIGNIFICANT CHANGE UP
B MICROTI IGG TITR SER: SIGNIFICANT CHANGE UP
B MICROTI IGM TITR SER: SIGNIFICANT CHANGE UP

## 2025-09-15 LAB
A PHAGOCYTOPH IGG TITR SER IF: NEGATIVE — SIGNIFICANT CHANGE UP
A PHAGOCYTOPH IGG TITR SER IF: SIGNIFICANT CHANGE UP
A PHAGOCYTOPH IGM TITR SER IF: NEGATIVE — SIGNIFICANT CHANGE UP
B BURGDOR AB SER QL IA: 0.2 IV — SIGNIFICANT CHANGE UP
B MICROTI IGG TITR SER: SIGNIFICANT CHANGE UP
E CHAFFEENSIS IGG TITR SER IF: SIGNIFICANT CHANGE UP
E CHAFFEENSIS IGG TITR SER: NEGATIVE — SIGNIFICANT CHANGE UP
E CHAFFEENSIS IGM TITR SER IF: NEGATIVE — SIGNIFICANT CHANGE UP
ERHLICIA RESULT COMMENT: SIGNIFICANT CHANGE UP